# Patient Record
Sex: FEMALE | Race: OTHER | Employment: UNEMPLOYED | URBAN - METROPOLITAN AREA
[De-identification: names, ages, dates, MRNs, and addresses within clinical notes are randomized per-mention and may not be internally consistent; named-entity substitution may affect disease eponyms.]

---

## 2017-03-16 ENCOUNTER — OFFICE VISIT (OUTPATIENT)
Dept: FAMILY MEDICINE CLINIC | Age: 42
End: 2017-03-16

## 2017-03-16 VITALS
SYSTOLIC BLOOD PRESSURE: 135 MMHG | BODY MASS INDEX: 23.09 KG/M2 | WEIGHT: 110 LBS | TEMPERATURE: 98.1 F | DIASTOLIC BLOOD PRESSURE: 89 MMHG | HEART RATE: 78 BPM | HEIGHT: 58 IN

## 2017-03-16 DIAGNOSIS — J40 BRONCHITIS: Primary | ICD-10-CM

## 2017-03-16 RX ORDER — LORATADINE 10 MG/1
10 TABLET ORAL DAILY
Qty: 30 TAB | Refills: 1 | Status: SHIPPED | OUTPATIENT
Start: 2017-03-16 | End: 2018-01-11 | Stop reason: ALTCHOICE

## 2017-03-16 RX ORDER — IBUPROFEN 600 MG/1
600 TABLET ORAL
Qty: 60 TAB | Refills: 1 | Status: SHIPPED | OUTPATIENT
Start: 2017-03-16 | End: 2018-01-11 | Stop reason: SDUPTHER

## 2017-03-16 RX ORDER — PSEUDOEPHEDRINE HCL 30 MG
30 TABLET ORAL
Qty: 20 TAB | Refills: 1 | Status: SHIPPED | OUTPATIENT
Start: 2017-03-16 | End: 2018-01-11 | Stop reason: ALTCHOICE

## 2017-03-16 NOTE — PROGRESS NOTES
Coordination of Care  1. Have you been to the ER, urgent care clinic since your last visit? Hospitalized since your last visit? No    2. Have you seen or consulted any other health care providers outside of the [address] [place] [address] since your last visit? Include any pap smears or colon screening. No    Medications  Medication Reconciliation Performed: no  Patient does not need refills     Learning Assessment Complete?  yes

## 2017-03-16 NOTE — PATIENT INSTRUCTIONS
Bronquitis: Instrucciones de cuidado - [ Bronchitis: Care Instructions ]  Instrucciones de cuidado    La bronquitis es julieta inflamación de los bronquios (conductos bronquiales), que llevan aire a los pulmones. Los tubos se hinchan y producen mucosidad, o flema. La mucosidad y los bronquios inflamados hacen que tosa. Es posible que tenga problemas para respirar. [place] de los casos de bronquitis son causados por virus fady los que causan los resfriados. Los antibióticos generalmente no ayudan y pueden ser dañinos. La bronquitis suele aparecer con Ulysess Fling y dura alrededor de 2 a 3 semanas en personas que por lo demás son saludables. La atención de seguimiento es julieta parte clave de ayala tratamiento y seguridad. Asegúrese de hacer y acudir a todas las citas, y llame a ayala médico si está teniendo problemas. También es julieta buena idea saber los resultados de los exámenes y mantener julieta lista de los medicamentos que lilly. ¿Cómo puede cuidarse en el hogar? · [employer] medicamentos exactamente fady le fueron recetados. Llame a ayala médico si radha que está teniendo un problema con joseph medicamentos. · Descanse un poco más. · [place] un analgésico (medicamento para el dolor) de venta stacia, fady acetaminofén (Tylenol), ibuprofeno (Advil, Motrin) o naproxeno (Aleve), para reducir la fiebre y [employer] myriam en el cuerpo. Silvana y siga todas las instrucciones de la [place]. · No tome dos o más analgésicos al mismo tiempo a menos que el médico se lo haya indicado. Muchos analgésicos contienen acetaminofén, es decir, Tylenol. El exceso de acetaminofén (Tylenol) puede ser dañino. · [place] un medicamento para la tos de venta stacia que contenga dextrometorfano para ayudar a aliviar la tos seca y persistente y así poder dormir. Evite los medicamentos para la tos que tengan más de un ingrediente [place]. Silvana y siga todas las instrucciones de la [place].   · Respire aire húmedo de un humidificador, ducha caliente o lavabo lleno de [place]. El calor y la humedad adelgazarán la mucosidad para que pueda expulsarla. · No fume. Fumar puede empeorar la bronquitis. Si necesita ayuda para dejar de fumar, hable con ayala médico sobre programas y medicamentos para dejar de fumar. Estos pueden aumentar joseph probabilidades de dejar el hábito para siempre. ¿Cuándo debe pedir ayuda? Llame al 911 en cualquier momento que considere que necesita atención de emergencia. Por ejemplo, llame si:  · [address] dificultades para respirar. Llame a ayala médico ahora mismo o busque atención médica inmediata si:  · Tiene nueva o peor dificultad para respirar. · Tose mucosidad (esputo) de color café oscuro o con richard. · Tiene julieta fiebre nueva o más juliocesar. · Tiene un salpullido nuevo. Preste especial atención a los cambios en ayala kyung y asegúrese de comunicarse con ayala médico si:  · Ayala tos es más profunda o más frecuente que antes, especialmente si nota más mucosidad o un cambio en el color de la mucosidad. · No mejora fady se esperaba. ¿Dónde puede encontrar más información en inglés? [place] a [email or web address] Escriba H333 en la búsqueda para aprender más acerca de \"Bronquitis: Instrucciones de cuidado - [ Bronchitis: Care Instructions ]. \"  Revisado: 23 olson, 2016  Versión del contenido: 11.1  © 0106-4739 [employer], Incorporated. Las instrucciones de cuidado fueron adaptadas bajo licencia por [employer] (which disclaims liability or warranty for this information). Si usted tiene [place] afección médica o sobre estas instrucciones, siempre pregunte a ayala profesional de kyung. [facility], Incorporated niega toda garantía o responsabilidad por ayala uso de esta información.

## 2017-03-16 NOTE — PROGRESS NOTES
Assessment/Plan:    Katie Richardson was seen today for generalized body aches, fever and cough. Diagnoses and all orders for this visit:    Bronchitis  -     pseudoephedrine (SUDAFED) 30 mg tablet; Take 1 Tab by mouth every six (6) hours as needed for Congestion. [place] 1 pastilla cada 6 horas  -     ibuprofen (MOTRIN) 600 mg tablet; Take 1 Tab by mouth every six (6) hours as needed for Pain. [place] 1 pastilla cada 6 horas  -     loratadine (CLARITIN) 10 mg tablet; Take 1 Tab by mouth daily. [place] 1 pastilla cada abiola        Follow-up Disposition:  Return if symptoms worsen or fail to improve. BELEN De Jesus Sa expressed understanding of this plan. An AVS was printed and given to the patient.      ----------------------------------------------------------------------    Chief Complaint   Patient presents with    Generalized Body Aches     since saturday    Fever    Cough       History of Present Illness:  Cough, sore throat, fever for 5 days. Body aches and fever ended 2 days ago. She is not taking any meds for the sxs. Cough is non productive, dry. Daughter has similar sxs but not as severe. She has not missed any work for the sxs until today. She asks for the next few days off, which is reasonable. No n/v/d. Appetite is okay but it hurts to swallow bc of her sore throat. Past Medical History:   Diagnosis Date    H/O seasonal allergies        Current Outpatient Prescriptions   Medication Sig Dispense Refill    pseudoephedrine (SUDAFED) 30 mg tablet Take 1 Tab by mouth every six (6) hours as needed for Congestion. [place] 1 pastilla cada 6 horas 20 Tab 1    ibuprofen (MOTRIN) 600 mg tablet Take 1 Tab by mouth every six (6) hours as needed for Pain. [place] 1 pastilla cada 6 horas 60 Tab 1    loratadine (CLARITIN) 10 mg tablet Take 1 Tab by mouth daily.  [place] 1 pastilla cada abiola 30 Tab 1       No Known Allergies    Social History   Substance Use Topics    Smoking status: Never Smoker    Smokeless tobacco: Not on file    Alcohol use No       No family history on file. Physical Exam:     Visit Vitals    /89 (BP 1 Location: Right arm, BP Patient Position: Sitting)    Pulse 78    Temp 98.1 °F (36.7 °C) (Oral)    Ht 4' 9.6\" (1.463 m)    Wt 110 lb (49.9 kg)    LMP 02/24/2017    BMI 23.31 kg/m2     gen all bundled up with hat and coat on in the clinic, afebrile.  Wearing a mask, dry cough often during the exam  A&Ox3  WDWN NAD  Respirations normal and non labored  HEENT- TM's bulging clear fluid  Nares patent swollen turbinates with clear mucoid discharge present  OP red no tonsil exudate present  Lungs are CTA donte   Cor RRR s1s2

## 2017-03-16 NOTE — MR AVS SNAPSHOT
Visit Information Sky Osman Personal Médico Departamento Tel[facility]fono del [facility]. Número de visita 3/16/2017 10:45 AM Clearance Jeri Esperanzasouleymaneai SarmientoKISHA [phone] Follow-up Instructions Return if symptoms worsen or fail to improve. Upcoming Health Maintenance Date Due DTaP/Tdap/Td series (1 - Tdap) 11/10/1994 PAP AKA CERVICAL CYTOLOGY 11/10/1994 INFLUENZA AGE 9 TO ADULT 8/1/2016 Alergias  Review Complete El: 11/3/2016 Por: Raza Guzman A partir del:  3/16/2017 No Known Allergies Vacunas actuales Sherren Ala No hay ninguna vacuna archivada. No revisadas esta visita You Were Diagnosed With   
  
 Fadumo Stammer Bronchitis    -  Primary ICD-10-CM: H05 ICD-9-CM: 050 Partes vitales PS Pulso Temperatura [place] ( percentil de crecimiento) Peso (percentil de crecimiento) LMP (última dasia) 135/89 (BP 1 Location: Right arm, BP Patient Position: Sitting) 78 98.1 °F (36.7 °C) (Oral) 4' 9.6\" (1.463 m) 110 lb (49.9 kg) 02/24/2017 BMI [facility]) Estado obstétrico Estatus de tabaquísmo 23.31 kg/m2 Having regular periods Never Smoker Historial de signos vitales BMI and BSA Data Body Mass Index Body Surface Area  
 23.31 kg/m 2 1.42 m 2 [facility] Name Phone [facility] [address] [facility] [phone] Hui lista de medicamentos actualizada Lista actualizada el: 3/16/17 11:49 AM.  Ganesh Warren use hui lista de medicamentos más reciente. ibuprofen 600 mg tablet También conocido fady:  MOTRIN Take 1 Tab by mouth every six (6) hours as needed for Pain. [place] 1 pastilla cada 6 horas  
  
 loratadine 10 mg tablet También conocido fady:  Carlos Enrique Shayla Take 1 Tab by mouth daily. [place] 1 pastilla cada abiola  
  
 pseudoephedrine 30 mg tablet También conocido fady:  SUDAFED  
 Take 1 Tab by mouth every six (6) hours as needed for Congestion. [place] 1 pastilla cada 6 horas Recetas Enviado a la [place] Refills  
 pseudoephedrine (SUDAFED) 30 mg tablet 1 Sig: Take 1 Tab by mouth every six (6) hours as needed for Congestion. [place] 1 pastilla cada 6 horas Class: Normal  
 Pharmacy: [id number] [facility] [address] [facility] [address], [address] meinKauf Ph #: [phone] Route: Oral  
 ibuprofen (MOTRIN) 600 mg tablet 1 Sig: Take 1 Tab by mouth every six (6) hours as needed for Pain. [place] 1 pastilla cada 6 horas Class: Normal  
 Pharmacy: [id number] [facility] [address] [facility], [address] Gisselle Kurt Ph #: [phone] Route: Oral  
 loratadine (CLARITIN) 10 mg tablet 1 Sig: Take 1 Tab by mouth daily. [place] 1 pastilla cada abiola Class: Normal  
 Pharmacy: [id number] [facility] [address] [facility], [address] [employer] Ph #: [phone] Route: Oral  
  
Instrucciones de seguimiento Return if symptoms worsen or fail to improve. Instrucciones para el Paciente Bronquitis: Instrucciones de cuidado - [ Bronchitis: Care Instructions ] Instrucciones de cuidado La bronquitis es julieta inflamación de los bronquios (conductos bronquiales), que llevan aire a los pulmones. Los tubos se hinchan y producen mucosidad, o flema. La mucosidad y los bronquios inflamados hacen que tosa. Es posible que tenga problemas para respirar. [place] de los casos de bronquitis son causados por virus fady los que causan los resfriados. Los antibióticos generalmente no ayudan y pueden ser dañinos. La bronquitis suele aparecer con Darinelo Hadley y dura alrededor de 2 a 3 semanas en personas que por lo demás son saludables. La atención de seguimiento es julieta parte clave de ayala tratamiento y seguridad. Asegúrese de hacer y acudir a todas las citas, y llame a ayala médico si está teniendo problemas.  También es julieta buena idea [employer] resultados de los exámenes y mantener julieta lista de los medicamentos que lilly. Cómo puede cuidarse en el hogar? · [employer] medicamentos exactamente fady le fueron recetados. Llame a ayala médico si radha que está teniendo un problema con joseph medicamentos. · Descanse un poco más. · [place] un analgésico (medicamento para el dolor) de venta stacia, fady acetaminofén (Tylenol), ibuprofeno (Advil, Motrin) o naproxeno (Aleve), para reducir la fiebre y [employer] myriam en el cuerpo. [place] y siga todas las instrucciones de la [place]. · No tome dos o más analgésicos al mismo tiempo a menos que el médico se lo haya indicado. Muchos analgésicos contienen acetaminofén, es decir, Tylenol. El exceso de acetaminofén (Tylenol) puede ser dañino. · [place] un medicamento para la tos de venta stacia que contenga dextrometorfano para ayudar a aliviar la tos seca y persistente y así poder dormir. Evite los medicamentos para la tos que tengan más de un ingrediente [place]. Silvana y siga todas las instrucciones de la [place]. · Respire aire húmedo de un humidificador, ducha caliente o lavabo lleno de [place]. El calor y la humedad adelgazarán la mucosidad para que pueda expulsarla. · No fume. Fumar puede empeorar la bronquitis. Si necesita ayuda para dejar de fumar, hable con ayala médico sobre programas y medicamentos para dejar de fumar. Estos pueden aumentar joseph probabilidades de dejar el hábito para siempre. Cuándo debe pedir ayuda? Llame al 911 en cualquier momento que considere que necesita atención de emergencia. Por ejemplo, llame si: · [address] dificultades para respirar. Llame a ayala médico ahora mismo o busque atención médica inmediata si: · Tiene nueva o peor dificultad para respirar. · Tose mucosidad (esputo) de color café oscuro o con richard. · Tiene julieta fiebre nueva o más juliocesar. · Tiene un salpullido nuevo.  
Preste especial atención a los cambios en ayala kyung y asegúrese de comunicarse con ayala médico si: 
 · Hui tos es más profunda o más frecuente que antes, especialmente si nota más mucosidad o un cambio en el color de la mucosidad. · No mejora fady se esperaba. Dónde puede encontrar más información en inglés? Violeta Cannon a [email or web address] Escriba H333 en la búsqueda para aprender más acerca de \"Bronquitis: Instrucciones de cuidado - [ Bronchitis: Care Instructions ]. \" 
Revisado: 23 [place], 2016 Versión del contenido: 11.1 © 4209-1177 Healthwise, Incorporated. Las instrucciones de cuidado fueron adaptadas bajo licencia por Good Help Connections (which disclaims liability or warranty for this information). Si usted tiene [place] afección médica o sobre estas instrucciones, siempre pregunte a hui profesional de kyung. Healthwise, Incorporated niega toda garantía o responsabilidad por hui uso de esta información. Introducing [facility]! [facility] introduce portal paciente MyChart . Ahora se puede acceder a partes de hui expediente médico, enviar por correo electrónico la oficina de hui médico y solicitar renovaciones de medicamentos en línea. En hui navegador de Internet , [place] a [email or web address] don en el usuario por Kay Hernández? Traci ochoa aquí en la sesión Mana Funk. Verá la página de registro [place]. Ingrese hui código de [facility] kathy y fady aparece a continuación. Usted no tendrá que [employer] código después de zeferino completado el proceso de registro . Si usted no se inscribe antes de la fecha de caducidad , debe solicitar un nuevo código. · MyChart Código de acceso : [id number] Expires: 6/14/2017 11:05 AM 
 
Ingresa los últimos cuatro dígitos de hui Número de Seguro Social ( xxxx ) y fecha de nacimiento ( dd / mm / aaaa ) fady se indica y urvashi clic en Enviar. Usted será llevado a la siguiente página de registro . Crear un ID MyChart .  Esta será hui ID de inicio de sesión de MyChart y no puede ser [place] , por lo que pensar en julieta que es guevara y fácil de recordar . Crear julieta contraseña MyChart . Usted puede cambiar ayala contraseña en cualquier momento . Ingrese ayala Password Reset de preguntas y [place] . [place] se puede utilizar en un momento posterior si usted olvida ayala contraseña. Introduzca ayala dirección de correo electrónico . Brittanylivier Heaton recibirá julieta notificación por correo electrónico cuando la nueva información está disponible en MyChart . Shoaib Pillar clic en Registrarse. Di Mings unruly y descargar porciones de ayala expediente médico. 
Suzanna clic en el enlace de descarga del menú Resumen para descargar julieta copia portátil de ayala información médica . Si tiene [employer] , por favor visite la sección de preguntas frecuentes del sitio web MyChart . Recuerde, MyChart NO es que se utilizará para las necesidades urgentes. Para emergencias médicas , llame al 911 . Ahora disponible en ayala iPhone y Android ! Por favor proporcione ale resumen de la documentación de cuidado a ayala próximo proveedor. Your primary care clinician is listed as NONE. If you have any questions after today's visit, please call [phone].

## 2018-01-11 ENCOUNTER — OFFICE VISIT (OUTPATIENT)
Dept: FAMILY MEDICINE CLINIC | Age: 43
End: 2018-01-11

## 2018-01-11 VITALS
BODY MASS INDEX: 22.46 KG/M2 | SYSTOLIC BLOOD PRESSURE: 137 MMHG | HEIGHT: 58 IN | TEMPERATURE: 98 F | WEIGHT: 107 LBS | DIASTOLIC BLOOD PRESSURE: 83 MMHG | OXYGEN SATURATION: 98 % | HEART RATE: 65 BPM

## 2018-01-11 DIAGNOSIS — G89.29 CHRONIC RIGHT-SIDED BACK PAIN, UNSPECIFIED BACK LOCATION: Primary | ICD-10-CM

## 2018-01-11 DIAGNOSIS — M54.9 CHRONIC RIGHT-SIDED BACK PAIN, UNSPECIFIED BACK LOCATION: Primary | ICD-10-CM

## 2018-01-11 DIAGNOSIS — M54.13 RADICULOPATHY OF CERVICOTHORACIC REGION: ICD-10-CM

## 2018-01-11 RX ORDER — IBUPROFEN 600 MG/1
600 TABLET ORAL
Qty: 40 TAB | Refills: 1 | Status: SHIPPED | OUTPATIENT
Start: 2018-01-11

## 2018-01-11 RX ORDER — AMITRIPTYLINE HYDROCHLORIDE 25 MG/1
25 TABLET, FILM COATED ORAL
Qty: 30 TAB | Refills: 1 | Status: SHIPPED | OUTPATIENT
Start: 2018-01-11

## 2018-01-11 NOTE — PROGRESS NOTES
Subjective:     Chief Complaint   Patient presents with    Neck Pain     denies injury, 8/10 pain since a month ago, radiates down to the right arm, denies n/v, or sob    Dental Injury        She  is a 38 y.o. female who presents for evaluation of neck pain. Onset 1 month ago w/o accident/trauma. Pt recalls she woke up w/ the pain. 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 Pain is worse w/ activity. Pain is minimal when she first wakes up, but worsens. Notes a pulsing pain. Sometimes radiates into her R arm. Frequently has pain prior to falling asleep r/t pain, which is more like \"asleep sensation:\"    Generalized in hand and all fingers. Rx motrin has helped w/ discomfort. ROS  Gen - no fever/chills  Resp - no dyspnea or cough  CV - no chest pain or TA  Rest per HPI    Past Medical History:   Diagnosis Date    H/O seasonal allergies      No past surgical history on file. Current Outpatient Prescriptions on File Prior to Visit   Medication Sig Dispense Refill    pseudoephedrine (SUDAFED) 30 mg tablet Take 1 Tab by mouth every six (6) hours as needed for Congestion. [place] 1 pastilla cada 6 horas 20 Tab 1    ibuprofen (MOTRIN) 600 mg tablet Take 1 Tab by mouth every six (6) hours as needed for Pain. [place] 1 pastilla cada 6 horas 60 Tab 1    loratadine (CLARITIN) 10 mg tablet Take 1 Tab by mouth daily. [place] 1 pastilla cada abiola 30 Tab 1     No current facility-administered medications on file prior to visit.          Objective:     Vitals:    01/11/18 1436   BP: 137/83   Pulse: 65   Temp: 98 °F (36.7 °C)   TempSrc: Oral   SpO2: 98%   Weight: 107 lb (48.5 kg)   Height: 4' 9.68\" (1.465 m)       Physical Examination:  General appearance - alert, well appearing, and in no distress  Eyes -sclera anicteric  Neck - supple, no significant adenopathy, no thyromegaly  Chest - clear to auscultation, no wheezes, rales or rhonchi, symmetric air entry  Heart - normal rate, regular rhythm, normal S1, S2, no murmurs, rubs, clicks or gallops  Neurological - alert, oriented, no focal findings or movement disorder noted  Abdomen-BS present/WNL x 4 quads, non-tender/distended, soft,no organomegaly    Assessment/ Plan:   Diagnoses and all orders for this visit:    1. Chronic right-sided back pain, unspecified back location  -     ibuprofen (MOTRIN) 600 mg tablet; Take 1 Tab by mouth every six (6) hours as needed for Pain. [place] 1 pastilla cada 6 horas  -     XR SPINE THORAC 3 V; Future  -     XR SPINE CERV 4 OR 5 V; Future  -     CBC W/O DIFF; Future  -     METABOLIC PANEL, COMPREHENSIVE; Future  -     HEMOGLOBIN A1C WITH EAG; Future  -     TSH 3RD GENERATION; Future  -     LIPID PANEL; Future    2. Radiculopathy of cervicothoracic region  -     XR SPINE THORAC 3 V; Future  -     XR SPINE CERV 4 OR 5 V; Future  -     amitriptyline (ELAVIL) 25 mg tablet; Take 1 Tab by mouth nightly. [place] 1 tab por la noche. Suspect radiculopathy given Hx/onset. Refer for plain films of T and C spine and to [facility] for Dr Efren Preston eval/chiropractor. Start motrin 600mg TID x 5 days then PRN. Elavil QHS to help w/  Sleep and nerve pain/discomfort. Counseled on side effect and starting 1/2 tab x 3-4 nights then full one as tolerated. Entered fasting labs Pt can have done at [facility] prior to [facility]. F/u with PCP after initial eval w/ Dr Efren Preston. Change POC PRN if labs or xrays abnormal.       I have discussed the diagnosis with the patient and the intended plan as seen in the above orders. The patient has received an after-visit summary and questions were answered concerning future plans. I have discussed medication side effects and warnings with the patient as well. The patient verbalizes understanding and agreement with the plan.     Follow-up Disposition: Not on File

## 2018-01-11 NOTE — PROGRESS NOTES
Coordination of Care  1. Have you been to the ER, urgent care clinic since your last visit? Hospitalized since your last visit? No    2. Have you seen or consulted any other health care providers outside of the [address] [place] [address] since your last visit? Include any pap smears or colon screening. No    Medications  Does the patient need refills? N/A    Learning Assessment Complete?  yes

## 2018-01-11 NOTE — PROGRESS NOTES
Statements below were documented by Manisha Jacob, [facility]  for this patient visit was Sumit Gonzales discharged home with AVS and Medication teaching . No further questions. Reviewed patient's medications, how to take, where to pickup and if any refills, patient verbalized understanding and has no questions. Copies of orders for x-rays of spine given and address to [facility] given with walk in hours for outpatient test. Patient given information about care card because the test ordered will be billed to patient . Patient explained to answer phone because a call would be coming in from [facility] to start the financial screening process fro Care Card. Patient verbalized understanding and has no questions . Patient explained that when bill is received to please call the number on the bill and explain that they are working on getting the care card. Patient acknowleged and understands the process without aany questions . Patient informed that if [facility] does not call to contact them in 3 weeks  to please give them a call .  Manisha Jacob RN

## 2018-01-11 NOTE — PATIENT INSTRUCTIONS
Dolor de eunice: Instrucciones de cuidado - [ Neck Pain: Care Instructions ]  Instrucciones de cuidado    Usted puede tener dolor de eunice en cualquier lugar desde la parte inferior de la william [employer] parte superior de los hombros. Puede extenderse [employer] parte superior de la espalda o los brazos. Lesionarse, pintar un techo, dormir con el eunice torcido, permanecer en la misma posición demasiado tiempo y [place] otras actividades pueden causar dolor de eunice. La mayoría de los myriam de eunice mejoran con cuidados en el hogar. Ayala médico podría recomendarle un medicamento para aliviar el dolor o relajar los músculos. Podría sugerir ejercicio y fisioterapia para aumentar la flexibilidad y aliviar el estrés. Maxx vez tenga que usar un collarín (cervical) especial para sostener el eunice por un [address] La atención de seguimiento es julieta parte clave de ayala tratamiento y seguridad. Asegúrese de hacer y acudir a todas las citas, y llame a ayala médico si está teniendo problemas. También es julieta buena idea saber los resultados de los exámenes y mantener julieta lista de los medicamentos que lilly. ¿Cómo puede cuidarse en el [facility]? · Pruebe a usar julieta almohadilla térmica a temperatura baja o mediana harpreet 15 a 20 minutos cada 2 o 3 horas. Pruebe julieta ducha tibia en vez de julieta sesión con la almohadilla térmica. · También puede probar julieta compresa de hielo harpreet 10 a 15 minutos cada 2 o 3 horas. Póngase un paño almaguer entre la compresa de hielo y la piel. · [employer] analgésicos (medicamentos para el dolor) exactamente fady le fueron indicados. ¨ Si el médico le recetó un analgésico, tómelo según las indicaciones. ¨ Si no está tomando un analgésico recetado, pregúntele a ayala médico si puede pattie jesus de [employer]. · Si ayala médico le recomienda un collarín cervical, úselo exactamente fady le fue indicado. ¿Cuándo debe pedir ayuda? Llame a ayala médico ahora mismo o busque atención médica inmediata si:  ?  · Tiene entumecimiento nuevo o que TERE Meng, las nalgas o las piernas. ? · Tiene debilidad nueva o que empeora en los brazos o las piernas. ([place] puede hacer que sea difícil ponerse de pie). ? · Pierde el control de la vejiga o los intestinos. ?Preste especial atención a los cambios en ayala kyung y asegúrese de comunicarse con ayala médico si:  ? · Ayala dolor de eunice empeora. ? · No está mejorando después de 1 semana. ? · No mejora fady se esperaba. ¿Dónde puede encontrar más información en inglés? Parvez Hager a http://[email or web address].info/. Escriba J483 en la búsqueda para aprender más acerca de \"Dolor de eunice: Instrucciones de cuidado - [ Neck Pain: Care Instructions ]. \"  Revisado: 21 marzo, 2017  Versión del contenido: 11.4  © 4934-5190 Healthwise, Incorporated. Las instrucciones de cuidado fueron adaptadas bajo licencia por [employer] (which disclaims liability or warranty for this information). Si usted tiene [place] afección médica o sobre estas instrucciones, siempre pregunte a ayala profesional de kyung. [facility], [employer] niega toda garantía o responsabilidad por ayala uso de esta información.

## 2018-01-11 NOTE — MR AVS SNAPSHOT
Visit Information Jhonny So Personal Médico Departamento Teléfono del Dep. Número de visita 1/11/2018 12:45 PM Imani Rincon NP [facility] [phone] Upcoming Health Maintenance Date Due DTaP/Tdap/Td series (1 - Tdap) 11/10/1994 PAP AKA CERVICAL CYTOLOGY 11/10/1994 Influenza Age 5 to Adult 8/1/2017 [place]  Review Complete El: 1/11/2018 Por: Imani Rincon NP  
 Denis Embs del:  1/11/2018 No Known Allergies Vacunas actuales Lanette Lewis No hay ninguna vacuna archivada. No revisadas esta visita You Were Diagnosed With   
  
 Jolly Ao Chronic right-sided back pain, unspecified back location    -  Primary ICD-10-CM: M54.9, G89.29 ICD-9-CM: 724.5, 338.29 Bronchitis     ICD-10-CM: J40 ICD-9-CM: 942 Radiculopathy of cervicothoracic region     ICD-10-CM: M54.13 ICD-9-CM: 723.4 Partes vitales PS Pulso Temperatura Melvin ( percentil de crecimiento) Peso (percentil de crecimiento) LMP (última dasia)  
 137/83 (BP 1 Location: Right arm, BP Patient Position: Sitting) 65 98 °F (36.7 °C) (Oral) 4' 9.68\" (1.465 m) 107 lb (48.5 kg) 12/27/2017 SpO2 BMI ([facility]) Estado obstétrico Estatus de tabaquísmo 98% 22.61 kg/m2 Having regular periods Never Smoker Historial de signos vitales BMI and BSA Data Body Mass Index Body Surface Area  
 22.61 kg/m 2 1.4 m 2 [facility] Name Phone [address] [employer] [facility] [address], [address] [place] [phone] Hui lista de medicamentos actualizada Lista actualizada el: 1/11/18  3:33 PM.  Josué Curry use hui lista de medicamentos más reciente. amitriptyline 25 mg tablet También conocido fady:  ELAVIL Take 1 Tab by mouth nightly. [place] 1 tab por la noche. ibuprofen 600 mg tablet También conocido fady:  MOTRIN  
 Take 1 Tab by mouth every six (6) hours as needed for Pain. [place] 1 pastilla cada 6 horas Recetas Enviado a la Taney Refills  
 ibuprofen (MOTRIN) 600 mg tablet 1 Sig: Take 1 Tab by mouth every six (6) hours as needed for Pain. [place] 1 pastilla cada 6 horas Class: Normal  
 [facility] [address] [facility] [address] [facility] Ph #: [phone] Route: Oral  
 amitriptyline (ELAVIL) 25 mg tablet 1 Sig: Take 1 Tab by mouth nightly. [place] 1 tab por la noche. Class: Normal  
 Pharmacy: [address], [address] Ph #: [phone] Route: Oral  
  
Por hacer 01/19/2018 Lab:  CBC W/O DIFF   
  
 01/19/2018 Lab:  HEMOGLOBIN A1C WITH EAG   
  
 01/19/2018 Lab:  LIPID PANEL   
  
 01/19/2018 Lab:  METABOLIC PANEL, COMPREHENSIVE   
  
 01/19/2018 Lab:  TSH 3RD GENERATION   
  
 01/19/2018 Imaging:  XR SPINE CERV 4 OR 5 V   
  
 01/19/2018 Imaging:  XR SPINE THORAC 3 V Instrucciones para el Paciente Dolor de eunice: Instrucciones de cuidado - [ Neck Pain: Care Instructions ] Instrucciones de cuidado Usted puede tener dolor de eunice en cualquier lugar desde la parte inferior de la william [employer] parte superior de los hombros. Puede extenderse [employer] parte superior de la espalda o los brazos. Lesionarse, pintar un techo, dormir con el eunice torcido, permanecer en la misma posición demasiado tiempo y [place] otras actividades pueden causar dolor de eunice. La mayoría de los myriam de eunice mejoran con cuidados en el hogar. Hui médico podría recomendarle un medicamento para aliviar el dolor o relajar los músculos. Podría sugerir ejercicio y fisioterapia para aumentar la flexibilidad y aliviar el estrés. Maxx vez tenga que usar un collarín (cervical) especial para sostener el eunice por un [address]  
Ortiz Crutch de seguimiento es julieta parte clave de hui tratamiento y seguridad. Asegúrese de hacer y acudir a todas las citas, y llame a hui médico si está teniendo problemas. También es julieta buena idea saber los resultados de los exámenes y mantener julieta lista de los medicamentos que lilly. Cómo puede cuidarse en el hogar? · Pruebe a usar julieta almohadilla térmica a temperatura baja o mediana harpreet 15 a 20 minutos cada 2 o 3 horas. Pruebe julieta ducha tibia en vez de julieta sesión con la almohadilla térmica. · También puede probar julieta compresa de hielo harpreet 10 a 15 minutos cada 2 o 3 horas. Póngase un paño almaguer entre la compresa de hielo y la piel. · [employer] analgésicos (medicamentos para el dolor) exactamente fady le fueron indicados. ¨ Si el médico le recetó un analgésico, tómelo según las indicaciones. ¨ Si no está tomando un analgésico recetado, pregúntele a hui médico si puede pattie jesus de [employer]. · Si hui médico le recomienda un collarín cervical, úselo exactamente fady le fue indicado. Cuándo debe pedir ayuda? Llame a hui médico ahora mismo o busque atención médica inmediata si: 
? · Tiene entumecimiento nuevo o que TERE Meng, las nalgas o las piernas. ? · Tiene debilidad nueva o que empeora en los brazos o las piernas. ([place] puede hacer que sea difícil ponerse de pie). ? · Pierde el control de la vejiga o los intestinos. ?Preste especial atención a los cambios en hui kyung y asegúrese de comunicarse con hui médico si: 
? · Hui dolor de eunice empeora. ? · No está mejorando después de 1 semana. ? · No mejora fady se esperaba. Dónde puede encontrar más información en inglés? Rajinder Begin a [email or web address]/. Escriba H862 en la búsqueda para aprender más acerca de \"Dolor de eunice: Instrucciones de cuidado - [ Neck Pain: Care Instructions ]. \" 
Revisado: 21 marzo, 2017 Versión del contenido: 11.4 © 7255-3851 Healthwise, Incorporated.  Las instrucciones de cuidado fueron adaptadas bajo licencia por [employer] (which disclaims liability or warranty for this information). Si usted tiene [place] afección médica o sobre estas instrucciones, siempre pregunte a ayala profesional de kyung. [facility], Incorporated niega toda garantía o responsabilidad por ayala uso de esta información. Introducing [facility] SERVICES! [facility] introduce portal paciente MyChart . Ahora se puede acceder a partes de ayala expediente médico, enviar por correo electrónico la oficina de ayala médico y solicitar renovaciones de medicamentos en línea. En ayala navegador de Internet , [employer] a [email or web address] clic en el usuario por Evi Husbands? Sarahy Dennis clic aquí en la sesión [facility]. Verá la página de registro [place]. Ingrese ayala código de [employer] kathy y fady aparece a continuación. Usted no tendrá que [employer] código después de zeferino completado el proceso de registro . Si usted no se inscribe antes de la fecha de caducidad , debe solicitar un nuevo código. · MyChart Código de acceso : [id number]- Expires: 4/11/2018  3:33 PM 
 
Ingresa los últimos cuatro dígitos de ayala Número de Seguro Social ( xxxx ) y fecha de nacimiento ( dd / mm / aaaa ) fady se indica y urvashi clic en Enviar. ted será llevado a la siguiente página de registro . Crear un ID MyChart . Esta será ayala ID de inicio de sesión de MyChart y no puede ser [place] , por lo que pensar en julieta que es Eliseo Moncho y fácil de recordar . Crear julieta contraseña MyChart . Usted puede cambiar ayala contraseña en cualquier momento . Ingrese ayala Password Reset de preguntas y [place] . [place] se puede utilizar en un momento posterior si usted olvida ayala contraseña. Introduzca ayala dirección de correo electrónico . Henrique Hancock recibirá julieta notificación por correo electrónico cuando la nueva información está disponible en MyChart . Rad Triplettw don en Registrarse.  Litpatricia Chi unruly y descargar porciones de ayala expediente Sal[place] Kaitlynn clic en el enlace de descarga del menú Resumen para descargar julieta copia portátil de ayala información médica . Si tiene [employer] , por favor visite la sección de preguntas frecuentes del sitio web MyChart . Recuerde, MyChart NO es que se utilizará para las necesidades urgentes. Para emergencias médicas , llame al 911 . Ahora disponible en ayala iPhone y Android ! Por favor proporcione ale resumen de la documentación de cuidado a ayala próximo proveedor. Your primary care clinician is listed as NONE. If you have any questions after today's visit, please call [phone].

## 2018-01-16 ENCOUNTER — HOSPITAL ENCOUNTER (OUTPATIENT)
Dept: GENERAL RADIOLOGY | Age: 43
Discharge: HOME | End: 2018-01-16
Payer: SELF-PAY

## 2018-01-16 DIAGNOSIS — M54.13 RADICULOPATHY OF CERVICOTHORACIC REGION: ICD-10-CM

## 2018-01-16 DIAGNOSIS — G89.29 CHRONIC RIGHT-SIDED BACK PAIN, UNSPECIFIED BACK LOCATION: ICD-10-CM

## 2018-01-16 DIAGNOSIS — M54.9 CHRONIC RIGHT-SIDED BACK PAIN, UNSPECIFIED BACK LOCATION: ICD-10-CM

## 2018-01-16 PROCEDURE — 72070 X-RAY EXAM THORAC SPINE 2VWS: CPT

## 2018-01-16 PROCEDURE — 72050 X-RAY EXAM NECK SPINE 4/5VWS: CPT

## 2018-01-18 ENCOUNTER — CLINICAL SUPPORT (OUTPATIENT)
Dept: FAMILY MEDICINE CLINIC | Age: 43
End: 2018-01-18

## 2018-01-18 ENCOUNTER — HOSPITAL ENCOUNTER (OUTPATIENT)
Dept: LAB | Age: 43
Discharge: HOME | End: 2018-01-18

## 2018-01-18 DIAGNOSIS — M54.9 CHRONIC RIGHT-SIDED BACK PAIN, UNSPECIFIED BACK LOCATION: ICD-10-CM

## 2018-01-18 DIAGNOSIS — Z23 ENCOUNTER FOR IMMUNIZATION: Primary | ICD-10-CM

## 2018-01-18 DIAGNOSIS — G89.29 CHRONIC RIGHT-SIDED BACK PAIN, UNSPECIFIED BACK LOCATION: ICD-10-CM

## 2018-01-18 LAB
BASOPHILS # BLD: 0 K/UL (ref 0–0.1)
BASOPHILS NFR BLD: 0 % (ref 0–1)
EOSINOPHIL # BLD: 0.2 K/UL (ref 0–0.4)
EOSINOPHIL NFR BLD: 3 % (ref 0–7)
ERYTHROCYTE [DISTWIDTH] IN BLOOD BY AUTOMATED COUNT: 13.3 % (ref 11.5–14.5)
HCT VFR BLD AUTO: 39.9 % (ref 35–47)
HGB BLD-MCNC: 12.9 G/DL (ref 11.5–16)
LYMPHOCYTES # BLD: 2.2 K/UL (ref 0.8–3.5)
LYMPHOCYTES NFR BLD: 31 % (ref 12–49)
MCH RBC QN AUTO: 29.5 PG (ref 26–34)
MCHC RBC AUTO-ENTMCNC: 32.3 G/DL (ref 30–36.5)
MCV RBC AUTO: 91.1 FL (ref 80–99)
MONOCYTES # BLD: 0.6 K/UL (ref 0–1)
MONOCYTES NFR BLD: 8 % (ref 5–13)
NEUTS SEG # BLD: 4.2 K/UL (ref 1.8–8)
NEUTS SEG NFR BLD: 58 % (ref 32–75)
PLATELET # BLD AUTO: 291 K/UL (ref 150–400)
RBC # BLD AUTO: 4.38 M/UL (ref 3.8–5.2)
WBC # BLD AUTO: 7.2 K/UL (ref 3.6–11)

## 2018-01-18 PROCEDURE — 80053 COMPREHEN METABOLIC PANEL: CPT | Performed by: NURSE PRACTITIONER

## 2018-01-18 PROCEDURE — 85025 COMPLETE CBC W/AUTO DIFF WBC: CPT | Performed by: NURSE PRACTITIONER

## 2018-01-18 PROCEDURE — 84443 ASSAY THYROID STIM HORMONE: CPT | Performed by: NURSE PRACTITIONER

## 2018-01-18 PROCEDURE — 80061 LIPID PANEL: CPT | Performed by: NURSE PRACTITIONER

## 2018-01-18 PROCEDURE — 83036 HEMOGLOBIN GLYCOSYLATED A1C: CPT | Performed by: NURSE PRACTITIONER

## 2018-01-18 NOTE — PROGRESS NOTES
Requests flu vaccine; denies fever, egg allergy. [facility] per protocol [facility] VIS information sheet given, explained possible S/E. Reviewed sx indicating need to be seen in ER. Pt had no adverse reaction at time of discharge.  Tabitha Crystal RN

## 2018-01-19 LAB
ALBUMIN SERPL-MCNC: 3.7 G/DL (ref 3.5–5)
ALBUMIN/GLOB SERPL: 1 {RATIO} (ref 1.1–2.2)
ALP SERPL-CCNC: 111 U/L (ref 45–117)
ALT SERPL-CCNC: 27 U/L (ref 12–78)
ANION GAP SERPL CALC-SCNC: 9 MMOL/L (ref 5–15)
AST SERPL-CCNC: 20 U/L (ref 15–37)
BILIRUB SERPL-MCNC: 0.3 MG/DL (ref 0.2–1)
BUN SERPL-MCNC: 17 MG/DL (ref 6–20)
BUN/CREAT SERPL: 30 (ref 12–20)
CALCIUM SERPL-MCNC: 8.8 MG/DL (ref 8.5–10.1)
CHLORIDE SERPL-SCNC: 106 MMOL/L (ref 97–108)
CHOLEST SERPL-MCNC: 213 MG/DL
CO2 SERPL-SCNC: 25 MMOL/L (ref 21–32)
CREAT SERPL-MCNC: 0.57 MG/DL (ref 0.55–1.02)
EST. AVERAGE GLUCOSE BLD GHB EST-MCNC: 123 MG/DL
GLOBULIN SER CALC-MCNC: 3.8 G/DL (ref 2–4)
GLUCOSE SERPL-MCNC: 98 MG/DL (ref 65–100)
HBA1C MFR BLD: 5.9 % (ref 4.2–6.3)
HDLC SERPL-MCNC: 49 MG/DL
HDLC SERPL: 4.3 {RATIO} (ref 0–5)
LDLC SERPL CALC-MCNC: 121.2 MG/DL (ref 0–100)
LIPID PROFILE,FLP: ABNORMAL
POTASSIUM SERPL-SCNC: 3.9 MMOL/L (ref 3.5–5.1)
PROT SERPL-MCNC: 7.5 G/DL (ref 6.4–8.2)
SODIUM SERPL-SCNC: 140 MMOL/L (ref 136–145)
TRIGL SERPL-MCNC: 214 MG/DL (ref ?–150)
TSH SERPL DL<=0.05 MIU/L-ACNC: 2.02 UIU/ML (ref 0.36–3.74)
VLDLC SERPL CALC-MCNC: 42.8 MG/DL

## 2018-01-29 ENCOUNTER — HOSPITAL ENCOUNTER (EMERGENCY)
Age: 43
Discharge: HOME | End: 2018-01-29
Attending: EMERGENCY MEDICINE
Payer: SELF-PAY

## 2018-01-29 VITALS
TEMPERATURE: 102 F | OXYGEN SATURATION: 98 % | DIASTOLIC BLOOD PRESSURE: 80 MMHG | HEIGHT: 58 IN | HEART RATE: 111 BPM | SYSTOLIC BLOOD PRESSURE: 145 MMHG | WEIGHT: 116.5 LBS | RESPIRATION RATE: 20 BRPM | BODY MASS INDEX: 24.45 KG/M2

## 2018-01-29 DIAGNOSIS — J11.1 INFLUENZA-LIKE SYNDROME: Primary | ICD-10-CM

## 2018-01-29 DIAGNOSIS — R50.9 ACUTE FEBRILE ILLNESS: ICD-10-CM

## 2018-01-29 LAB
FLUAV AG NPH QL IA: NEGATIVE
FLUBV AG NOSE QL IA: NEGATIVE
S PYO AG THROAT QL: NEGATIVE

## 2018-01-29 PROCEDURE — 74011250637 HC RX REV CODE- 250/637: Performed by: EMERGENCY MEDICINE

## 2018-01-29 PROCEDURE — 87804 INFLUENZA ASSAY W/OPTIC: CPT | Performed by: STUDENT IN AN ORGANIZED HEALTH CARE EDUCATION/TRAINING PROGRAM

## 2018-01-29 PROCEDURE — 87147 CULTURE TYPE IMMUNOLOGIC: CPT | Performed by: EMERGENCY MEDICINE

## 2018-01-29 PROCEDURE — 99283 EMERGENCY DEPT VISIT LOW MDM: CPT

## 2018-01-29 PROCEDURE — 87070 CULTURE OTHR SPECIMN AEROBIC: CPT | Performed by: EMERGENCY MEDICINE

## 2018-01-29 PROCEDURE — 74011250636 HC RX REV CODE- 250/636: Performed by: EMERGENCY MEDICINE

## 2018-01-29 PROCEDURE — 96372 THER/PROPH/DIAG INJ SC/IM: CPT

## 2018-01-29 PROCEDURE — 87880 STREP A ASSAY W/OPTIC: CPT

## 2018-01-29 RX ORDER — ACETAMINOPHEN 500 MG
1000 TABLET ORAL
Status: COMPLETED | OUTPATIENT
Start: 2018-01-29 | End: 2018-01-29

## 2018-01-29 RX ORDER — ACETAMINOPHEN 500 MG
1000 TABLET ORAL
Qty: 60 TAB | Refills: 0 | Status: SHIPPED | OUTPATIENT
Start: 2018-01-29

## 2018-01-29 RX ORDER — OSELTAMIVIR PHOSPHATE 75 MG/1
75 CAPSULE ORAL 2 TIMES DAILY
Qty: 10 CAP | Refills: 0 | Status: SHIPPED | OUTPATIENT
Start: 2018-01-29 | End: 2018-02-03

## 2018-01-29 RX ORDER — KETOROLAC TROMETHAMINE 30 MG/ML
30 INJECTION, SOLUTION INTRAMUSCULAR; INTRAVENOUS
Status: COMPLETED | OUTPATIENT
Start: 2018-01-29 | End: 2018-01-29

## 2018-01-29 RX ORDER — IBUPROFEN 800 MG/1
800 TABLET ORAL
Qty: 30 TAB | Refills: 0 | Status: SHIPPED | OUTPATIENT
Start: 2018-01-29

## 2018-01-29 RX ADMIN — KETOROLAC TROMETHAMINE 30 MG: 30 INJECTION, SOLUTION INTRAMUSCULAR at 22:34

## 2018-01-29 RX ADMIN — ACETAMINOPHEN 1000 MG: 500 TABLET, FILM COATED ORAL at 22:34

## 2018-01-29 NOTE — LETTER
[address] 
[address] [place], [place] [address] [address] 
[phone] Work/School Note Date: 1/29/2018 To Whom It May concern: 
 
Josue Joni was seen and treated today in the emergency room by the following provider(s): 
Attending Provider: Chaz Cehung MD.   
 
Josue Quinones {Return to school/sport/work:16289} Sincerely, 
 
 
 
 
Gayl Gilford

## 2018-01-29 NOTE — LETTER
[address] 
[address] [facility] [address] 
[phone] Work/School Note Date: 1/29/2018 To Whom It May concern: 
 
Johnny Matthew was seen and treated today in the emergency room by the following provider(s): 
Attending Provider: Ruben Carvajal MD.   
 
Johnny Matthew may return to work on 1/31/18. Sincerely, Clement Schroeder MD

## 2018-01-30 NOTE — ED PROVIDER NOTES
HPI Comments: The patient is a 78-year-old,  female, with limited English-speaking skills are presents to the ED with a complaint of body aches, sore throat, dry cough and congestion x2 days of headache, fever, and chills x2 days. She also admits to nausea and abdominal cramps. Denies any diarrhea, constipation, neck stiffness, back pain, chest pain, or shortness of breath with exertion, dysuria, hematuria, vaginal discharge or bleeding, dizziness, weakness, numbness, sick contact at home, skin rash, recent travel. Patient is a 38 y.o. female presenting with sore throat and general illness. Sore Throat      Generalized Body Aches          Past Medical History:   Diagnosis Date    H/O seasonal allergies        History reviewed. No pertinent surgical history. History reviewed. No pertinent family history. Social History     Social History    Marital status: SINGLE     Spouse name: N/A    Number of children: N/A    Years of education: N/A     Occupational History    Not on file. Social History Main Topics    Smoking status: Never Smoker    Smokeless tobacco: Never Used    Alcohol use No    Drug use: No    Sexual activity: Not on file     Other Topics Concern    Not on file     Social History Narrative         ALLERGIES: Review of patient's allergies indicates no known allergies. Review of Systems   HENT: Positive for sore throat. All other systems reviewed and are negative. Vitals:    01/29/18 2149   BP: 135/81   Pulse: (!) 105   Resp: 16   Temp: 100.4 °F (38 °C)   SpO2: 98%   Weight: 52.8 kg (116 lb 8 oz)   Height: 4' 9.5\" (1.461 m)            Physical Exam   Nursing note and vitals reviewed. CONSTITUTIONAL: Well-appearing; well-nourished; in mild distress  HEAD: Normocephalic; atraumatic  EYES: PERRL; EOM intact; conjunctiva and sclera are clear bilaterally.   ENT: No rhinorrhea; normal pharynx with no tonsillar hypertrophy; mucous membranes pink/moist, no erythema, no exudate. NECK: Supple; non-tender; no cervical lymphadenopathy  CARD: Normal S1, S2; no murmurs, rubs, or gallops. Regular rate and rhythm. RESP: Normal respiratory effort; breath sounds clear and equal bilaterally; no wheezes, rhonchi, or rales. ABD: Normal bowel sounds; non-distended; non-tender; no palpable organomegaly, no masses, no bruits. Back Exam: Normal inspection; no vertebral point tenderness, no CVA tenderness. Normal range of motion. EXT: Normal ROM in all four extremities; non-tender to palpation; no swelling or deformity; distal pulses are normal, no edema. SKIN: Warm; dry; no rash. NEURO:Alert and oriented x 3, coherent, WILVER-XII grossly intact, sensory and motor are non-focal.        MDM  Number of Diagnoses or Management Options  Diagnosis management comments: Assessment: 47-year-old female, who presents with  Influenza like syndrome-rule out strep pharyngitis. Plan: antipyretic/ rapid strep throat test with throat culture if negative/ education and reassurance/ Monitor and Reevaluate. Amount and/or Complexity of Data Reviewed  Clinical lab tests: ordered and reviewed  Tests in the radiology section of CPT®: ordered and reviewed  Tests in the medicine section of CPT®: reviewed and ordered  Discussion of test results with the performing providers: yes  Decide to obtain previous medical records or to obtain history from someone other than the patient: yes  Obtain history from someone other than the patient: yes  Review and summarize past medical records: yes  Discuss the patient with other providers: yes  Independent visualization of images, tracings, or specimens: yes    Risk of Complications, Morbidity, and/or Mortality  Presenting problems: moderate  Diagnostic procedures: moderate  Management options: moderate      ED Course       Procedures     Progress Note:   Pt has been reexamined by Burke Irvin MD. Pt is feeling much better. Symptoms have improved.  All available results have been reviewed with pt and any available family. Pt understands sx, dx, and tx in ED. Care plan has been outlined and questions have been answered. Pt is ready to go home. Will send home on Influenza/ acute febrile illness instruction. Prescription of Tylenol, ibuprofen, and Tamiflu. Outpatient referral with PCP as needed. Written by Kerri Phillips MD,10:59 PM    .   .

## 2018-01-30 NOTE — ED NOTES
MD reviewed discharge instructions with the patient. The patient verbalized understanding. Patient ambulated out of ED with . Work note given. HR and temp elevated. MD notified. No complaints or concerns noted.

## 2018-02-01 LAB
BACTERIA SPEC CULT: ABNORMAL
BACTERIA SPEC CULT: ABNORMAL
SERVICE CMNT-IMP: ABNORMAL

## 2018-02-05 ENCOUNTER — OFFICE VISIT (OUTPATIENT)
Dept: FAMILY MEDICINE CLINIC | Age: 43
End: 2018-02-05

## 2018-02-05 ENCOUNTER — HOSPITAL ENCOUNTER (OUTPATIENT)
Dept: LAB | Age: 43
Discharge: HOME | End: 2018-02-05

## 2018-02-05 VITALS
TEMPERATURE: 97.6 F | DIASTOLIC BLOOD PRESSURE: 79 MMHG | WEIGHT: 110 LBS | SYSTOLIC BLOOD PRESSURE: 141 MMHG | BODY MASS INDEX: 23.39 KG/M2 | HEART RATE: 68 BPM

## 2018-02-05 DIAGNOSIS — R30.0 DYSURIA: Primary | ICD-10-CM

## 2018-02-05 LAB
BILIRUB UR QL STRIP: NEGATIVE
GLUCOSE UR-MCNC: NEGATIVE MG/DL
KETONES P FAST UR STRIP-MCNC: NEGATIVE MG/DL
PH UR STRIP: 6 [PH] (ref 4.6–8)
PROT UR QL STRIP: NEGATIVE
SP GR UR STRIP: 1.02 (ref 1–1.03)
UA UROBILINOGEN AMB POC: NORMAL (ref 0.2–1)
URINALYSIS CLARITY POC: CLEAR
URINALYSIS COLOR POC: YELLOW
URINE BLOOD POC: NORMAL
URINE LEUKOCYTES POC: NEGATIVE
URINE NITRITES POC: NEGATIVE

## 2018-02-05 PROCEDURE — 87491 CHLMYD TRACH DNA AMP PROBE: CPT | Performed by: NURSE PRACTITIONER

## 2018-02-05 NOTE — PROGRESS NOTES
Assessment/Plan:       ICD-10-CM ICD-9-CM    1. Dysuria R30.0 788.1 AMB POC URINALYSIS DIP STICK MANUAL W/O MICRO      CHLAMYDIA/GC PCR   Urine for gonorrhea and chlamydia  Follow-up Disposition:  Return if symptoms worsen or fail to improve. [address], [address] [place]  [address] [facility] [address] [place] [address] [place] [address]  Phone: [phone] Fax: [phone]      [facility]  Subjective:   Burns with urination, back pain. L side.  #[id number]. Chief Complaint   Patient presents with    Back Pain     Back pain, Urinary burning X about 1 months    Swetha Freeman is a 38 y.o. PATIENT REFUSED female. HPI:   She  has a past medical history of H/O seasonal allergies. Review of Systems: Negative for: fever, chest pain, shortness of breath, leg swelling, exertional dyspnea, palpitations. Current Medications:   Current Outpatient Prescriptions on File Prior to Visit   Medication Sig    ibuprofen (MOTRIN) 800 mg tablet Take 1 Tab by mouth every six (6) hours as needed for Pain (Fever).  acetaminophen (TYLENOL EXTRA STRENGTH) 500 mg tablet Take 2 Tabs by mouth every six (6) hours as needed for Pain or Fever.  ibuprofen (MOTRIN) 600 mg tablet Take 1 Tab by mouth every six (6) hours as needed for Pain. [place] 1 pastilla cada 6 horas    amitriptyline (ELAVIL) 25 mg tablet Take 1 Tab by mouth nightly. [place] 1 tab por la noche. No current facility-administered medications on file prior to visit. Social History: She  reports that she has never smoked. She has never used smokeless tobacco. She reports that she does not drink alcohol or use illicit drugs. Objective:     Vitals:    02/05/18 1103   BP: 141/79   Pulse: 68   Temp: 97.6 °F (36.4 °C)   TempSrc: Oral   Weight: 110 lb (49.9 kg)    Patient's last menstrual period was 01/29/2018.    Wt Readings from Last 2 Encounters:   02/05/18 110 lb (49.9 kg)   01/29/18 116 lb 8 oz (52.8 kg)     Results for orders placed or performed in visit on 02/05/18   AMB POC URINALYSIS DIP STICK MANUAL W/O MICRO   Result Value Ref Range    Color (UA POC) Yellow     Clarity (UA POC) Clear     Glucose (UA POC) Negative Negative    Bilirubin (UA POC) Negative Negative    Ketones (UA POC) Negative Negative    Specific gravity (UA POC) 1.025 1.001 - 1.035    Blood (UA POC) Trace Negative    pH (UA POC) 6.0 4.6 - 8.0    Protein (UA POC) Negative Negative    Urobilinogen (UA POC) 0.2 mg/dL 0.2 - 1    Nitrites (UA POC) Negative Negative    Leukocyte esterase (UA POC) Negative Negative      Physical Examination:  General appearance - well developed, no acute distress. Chest - clear to auscultation. Heart - regular rate and rhythm without murmurs, rubs, or gallops. Abdomen - bowel sounds present x 4, NT, ND. Extremities - pulses intact. No peripheral edema. Musculoskeletal - no CVAT. Assessment/Plan:   Diagnoses and all orders for this visit:    1. Dysuria  -     AMB POC URINALYSIS DIP STICK MANUAL W/O MICRO  -     CHLAMYDIA/GC PCR; Future      Follow-up Disposition:  Return if symptoms worsen or fail to improve. Madison Lomeli, JAVIER, FNP-BC, BC-ADM  Prisca Leahy expressed understanding of this plan.

## 2018-02-05 NOTE — PROGRESS NOTES
Coordination of Care  1. Have you been to the ER, urgent care clinic since your last visit? Hospitalized since your last visit? Yes When: 1/29/18  [facility]    2. Have you seen or consulted any other health care providers outside of the [address] [place] [address] since your last visit? Include any pap smears or colon screening. No    Medications  Does the patient need refills?  YES    Learning Assessment Complete? yes  Results for orders placed or performed in visit on 02/05/18   AMB POC URINALYSIS DIP STICK MANUAL W/O MICRO   Result Value Ref Range    Color (UA POC) Yellow     Clarity (UA POC) Clear     Glucose (UA POC) Negative Negative    Bilirubin (UA POC) Negative Negative    Ketones (UA POC) Negative Negative    Specific gravity (UA POC) 1.025 1.001 - 1.035    Blood (UA POC) Trace Negative    pH (UA POC) 6.0 4.6 - 8.0    Protein (UA POC) Negative Negative    Urobilinogen (UA POC) 0.2 mg/dL 0.2 - 1    Nitrites (UA POC) Negative Negative    Leukocyte esterase (UA POC) Negative Negative

## 2018-02-07 LAB
C TRACH DNA SPEC QL NAA+PROBE: NEGATIVE
N GONORRHOEA DNA SPEC QL NAA+PROBE: NEGATIVE
SAMPLE TYPE: NORMAL
SERVICE CMNT-IMP: NORMAL
SPECIMEN SOURCE: NORMAL

## 2020-03-17 ENCOUNTER — HOSPITAL ENCOUNTER (EMERGENCY)
Age: 45
Discharge: HOME | End: 2020-03-17
Attending: EMERGENCY MEDICINE | Admitting: EMERGENCY MEDICINE
Payer: SELF-PAY

## 2020-03-17 VITALS
HEART RATE: 54 BPM | RESPIRATION RATE: 18 BRPM | DIASTOLIC BLOOD PRESSURE: 77 MMHG | OXYGEN SATURATION: 98 % | TEMPERATURE: 98.1 F | SYSTOLIC BLOOD PRESSURE: 120 MMHG

## 2020-03-17 DIAGNOSIS — J02.9 PHARYNGITIS, UNSPECIFIED ETIOLOGY: Primary | ICD-10-CM

## 2020-03-17 LAB — DEPRECATED S PYO AG THROAT QL EIA: NEGATIVE

## 2020-03-17 PROCEDURE — 87880 STREP A ASSAY W/OPTIC: CPT

## 2020-03-17 PROCEDURE — 99282 EMERGENCY DEPT VISIT SF MDM: CPT

## 2020-03-17 PROCEDURE — 87070 CULTURE OTHR SPECIMN AEROBIC: CPT

## 2020-03-17 RX ORDER — DICLOFENAC SODIUM 75 MG/1
75 TABLET, DELAYED RELEASE ORAL 2 TIMES DAILY
Qty: 30 TAB | Refills: 0 | Status: SHIPPED | OUTPATIENT
Start: 2020-03-17

## 2020-03-17 NOTE — ED PROVIDER NOTES
Viraj Segundo is a 53 y.o. female  who presents by private vehicle to ER with c/o Patient presents with:  Sore Throat  Patient presents with 1 week history of throat pain. Patient has taken ibuprofen with no relief. Patient denies fever or chills. Denies any other associated symptoms. Patient denies any recent travel or sick contacts. She specifically denies any fevers, chills, nausea, vomiting, chest pain, shortness of breath, headache, rash, diarrhea, abdominal pain, urinary/bowel changes, sweating or weight loss. PCP: Gwen North, NP   PMHx significant for: Past Medical History:  No date: H/O seasonal allergies   PSHx significant for: History reviewed. No pertinent surgical history. Social Hx: Tobacco use: Social History    Tobacco Use      Smoking status: Never Smoker      Smokeless tobacco: Never Used  ; EtOH use: The patient states she drinks 0 per week.; Illicit Drug use: Allergies:  No Known Allergies    There are no other complaints, changes or physical findings at this time. Past Medical History:   Diagnosis Date    H/O seasonal allergies        History reviewed. No pertinent surgical history. History reviewed. No pertinent family history.     Social History     Socioeconomic History    Marital status: SINGLE     Spouse name: Not on file    Number of children: Not on file    Years of education: Not on file    Highest education level: Not on file   Occupational History    Not on file   Social Needs    Financial resource strain: Not on file    Food insecurity     Worry: Not on file     Inability: Not on file    Transportation needs     Medical: Not on file     Non-medical: Not on file   Tobacco Use    Smoking status: Never Smoker    Smokeless tobacco: Never Used   Substance and Sexual Activity    Alcohol use: No    Drug use: No    Sexual activity: Not on file   Lifestyle    Physical activity     Days per week: Not on file     Minutes per session: Not on file    Stress: Not on file   Relationships    Social connections     Talks on phone: Not on file     Gets together: Not on file     Attends [employer] service: Not on file     Active member of club or organization: Not on file     Attends meetings of clubs or organizations: Not on file     Relationship status: Not on file    Intimate partner violence     Fear of current or ex partner: Not on file     Emotionally abused: Not on file     Physically abused: Not on file     Forced sexual activity: Not on file   Other Topics Concern    Not on file   Social History Narrative    Not on file         ALLERGIES: Patient has no known allergies. Review of Systems   Constitutional: Negative for activity change, chills and fever. HENT: Positive for sore throat. Negative for congestion and rhinorrhea. Respiratory: Negative for shortness of breath. Cardiovascular: Negative for chest pain and leg swelling. Gastrointestinal: Negative for abdominal pain, diarrhea, nausea and vomiting. Genitourinary: Negative for dysuria, vaginal bleeding and vaginal discharge. Musculoskeletal: Negative for arthralgias and myalgias. Neurological: Negative for dizziness. Psychiatric/Behavioral: Negative for confusion. All other systems reviewed and are negative. Vitals:    03/17/20 1940   BP: 120/77   Pulse: (!) 54   Resp: 18   Temp: 98.1 °F (36.7 °C)   SpO2: 98%            Physical Exam  Constitutional:       Appearance: Normal appearance. She is well-developed. She is not ill-appearing, toxic-appearing or diaphoretic. HENT:      Head: Normocephalic and atraumatic. Right Ear: External ear normal.      Left Ear: External ear normal.      Nose: Nose normal.      Mouth/Throat:      Pharynx: Uvula midline. Posterior oropharyngeal erythema present. No oropharyngeal exudate. Tonsils: No tonsillar exudate. Eyes:      General: Lids are normal.         Right eye: No discharge.          Left eye: No discharge. Conjunctiva/sclera: Conjunctivae normal.   Neck:      Musculoskeletal: Normal range of motion. Thyroid: No thyromegaly. Trachea: No tracheal deviation. Cardiovascular:      Rate and Rhythm: Normal rate and regular rhythm. Heart sounds: Normal heart sounds. Pulmonary:      Effort: Pulmonary effort is normal.      Breath sounds: Normal breath sounds. Abdominal:      General: Bowel sounds are normal.      Palpations: Abdomen is soft. Musculoskeletal: Normal range of motion. Skin:     General: Skin is warm and dry. Neurological:      Mental Status: She is alert and oriented to person, place, and time. Psychiatric:         Judgment: Judgment normal.          MDM  Number of Diagnoses or Management Options  Pharyngitis, unspecified etiology:   Diagnosis management comments: Assesment/Plan- 53 y.o. Patient presents with:  Sore Throat  differential includes: pharyngitis, strep. Labs and imaging reviewed with negative strep. Patient is well appearing, afebrile and tolerating PO . Recommend PCP follow up. Patient educated on reasons to return to the ED.            Procedures

## 2020-03-18 ENCOUNTER — PATIENT OUTREACH (OUTPATIENT)
Dept: FAMILY MEDICINE CLINIC | Age: 45
End: 2020-03-18

## 2020-03-18 NOTE — ED NOTES
Patient given discharge instructions. No questions or concerns at this time. Patients VSS and in no acute distress. Patient ambulatory out of tent at discharge.

## 2020-03-18 NOTE — DISCHARGE INSTRUCTIONS
Patient Education        Dolor de garganta: Instrucciones de cuidado  Sore Throat: Care Instructions  Instrucciones de cuidado    Julieta infección por un virus o julieta bacteria causa la mayoría de los myriam de garganta. El humo del cigarrillo, el aire seco, el aire contaminado, las [place] y gritar también pueden causar dolor de garganta. El dolor de garganta puede ser intenso y [place]. Por deshawn, la mayoría de los myriam de garganta desaparecen por sí mismos. Si tiene [employer], el médico podría recetarle antibióticos. La atención de seguimiento es julieta parte clave de ayala tratamiento y seguridad. Asegúrese de hacer y acudir a todas las citas, y llame a ayala médico si está teniendo problemas. También es julieta buena idea saber los resultados de joseph exámenes y mantener julieta lista de los medicamentos que lilly. ¿Cómo puede cuidarse en el hogar? · Si ayala médico le recetó antibióticos, tómelos según las indicaciones. No deje de tomarlos por el hecho de sentirse mejor. Debe pattie todos los antibióticos hasta terminarlos. · Suzanna gárgaras de agua salada tibia julieta vez cada hora para ayudar a reducir la hinchazón y aliviar la molestia. Mezcle 1 cucharadita de sal en 1 taza de agua tibia. · [place] un analgésico (medicamento para el dolor) de venta stacia, fady acetaminofén (Tylenol), ibuprofeno (Advil, Motrin) o naproxeno (Aleve). Silvana y siga todas las instrucciones de la [place]. · Tenga cuidado cuando tome medicamentos de venta stacia para el resfriado o la gripe y Tylenol al [employer]. Muchos de estos medicamentos contienen acetaminofén, o sea, Tylenol. Silvana las etiquetas para asegurarse de que no está tomando julieta dosis mayor que la recomendada. El exceso de acetaminofén (Tylenol) puede ser dañino. · [place] abundantes líquidos. Los líquidos podrían ayudar a aliviar la irritación de la garganta. Beber líquidos calientes, fayd té o sopa, podría ayudarle a reducir el dolor de garganta.   · Chupe pastillas para la garganta de venta stacia para aliviar el dolor. Los caramelos duros o los caramelos regulares para la tos también podrían ayudar. Nunca se los dé a un everett pequeño porque corre el riesgo de atragantarse. · No fume ni permita que otros fumen cerca de usted. Si necesita ayuda para dejar de fumar, hable con ayala médico [employer] y medicamentos para dejar de fumar. Estos pueden aumentar joseph probabilidades de dejar el hábito para siempre. · Use un humidificador o vaporizador para añadir humedad en ayala dormitorio. Siga las instrucciones para limpiar el aparato. ¿Cuándo debe pedir ayuda? Llame a ayala médico ahora mismo o busque atención médica inmediata si:    · Tiene dificultades para tragar o estas empeoran.     · El dolor de garganta empeora mucho en un lado.    Preste especial atención a los cambios en ayala kyung y asegúrese de comunicarse con ayala médico si no mejora fady se esperaba. ¿Dónde puede encontrar más información en inglés? Vaya a http://patricia-nikhil.info/  P[address] en la búsqueda para aprender más acerca de \"Dolor de garganta: Instrucciones de cuidado. \"  Revisado: 28 julio, 2019Versión del contenido: 12.4  © 3180-1618 Healthwise, Incorporated. Las instrucciones de cuidado fueron adaptadas bajo licencia por [employer] (which disclaims liability or warranty for this information). Si usted tiene [employer] afección médica o sobre estas instrucciones, siempre pregunte a ayala profesional de kyung. Healthwise, Incorporated niega toda garantía o responsabilidad por ayala uso de esta información.

## 2020-03-18 NOTE — PROGRESS NOTES
ACM attempted to reach patient for follow up and further screening for COVID 19 after risk factors identified during recent ER visit on 3/17/20. ACM unable to reach the patient.   Message left with ACM contact information for concerns about call

## 2020-03-19 LAB
BACTERIA SPEC CULT: NORMAL
SERVICE CMNT-IMP: NORMAL

## 2020-03-25 ENCOUNTER — PATIENT OUTREACH (OUTPATIENT)
Dept: FAMILY MEDICINE CLINIC | Age: 45
End: 2020-03-25

## 2021-11-05 ENCOUNTER — TRANSCRIBE ORDER (OUTPATIENT)
Dept: SCHEDULING | Age: 46
End: 2021-11-05

## 2021-11-05 DIAGNOSIS — Z12.31 VISIT FOR SCREENING MAMMOGRAM: Primary | ICD-10-CM

## 2021-11-08 ENCOUNTER — TELEPHONE (OUTPATIENT)
Dept: FAMILY PLANNING/WOMEN'S HEALTH CLINIC | Age: 46
End: 2021-11-08

## 2021-11-08 NOTE — TELEPHONE ENCOUNTER
Called patient to confirm upcomming appointment. Patient did not answer the phone. Left voicemail message.

## 2021-11-09 ENCOUNTER — HOSPITAL ENCOUNTER (OUTPATIENT)
Dept: LAB | Age: 46
Discharge: HOME | End: 2021-11-09

## 2021-11-09 ENCOUNTER — OFFICE VISIT (OUTPATIENT)
Dept: FAMILY PLANNING/WOMEN'S HEALTH CLINIC | Age: 46
End: 2021-11-09

## 2021-11-09 ENCOUNTER — HOSPITAL ENCOUNTER (OUTPATIENT)
Dept: MAMMOGRAPHY | Age: 46
Discharge: HOME | End: 2021-11-09

## 2021-11-09 DIAGNOSIS — Z12.31 VISIT FOR SCREENING MAMMOGRAM: ICD-10-CM

## 2021-11-09 DIAGNOSIS — Z01.419 ENCOUNTER FOR WELL WOMAN EXAM: ICD-10-CM

## 2021-11-09 DIAGNOSIS — Z01.419 ENCOUNTER FOR GYNECOLOGICAL EXAMINATION: Primary | ICD-10-CM

## 2021-11-09 PROCEDURE — 77067 SCR MAMMO BI INCL CAD: CPT

## 2021-11-09 PROCEDURE — 87624 HPV HI-RISK TYP POOLED RSLT: CPT

## 2021-11-09 PROCEDURE — 88175 CYTOPATH C/V AUTO FLUID REDO: CPT

## 2021-11-09 NOTE — PROGRESS NOTES
Assessment/Plan:    Diagnoses and all orders for this visit:    1. Encounter for gynecological examination  -     PAP IG, APTIMA HPV AND RFX 16/18,45 ([id number]); Future    2. Encounter for well woman exam    First mammogram today, what to expect was discussed with the pt         BELEN Wheelernamrata Petty expressed understanding of this plan. An AVS was printed and given to the patient.      ----------------------------------------------------------------------    Chief Complaint   Patient presents with    Well Woman       History of Present Illness:  [facility] first visit  She is    Stopped having periods about one year ago  In a safe relationship, no risk of DV, no pain with intercourse  Last pap about 8 years ago she states  No breast complaints. No hx of abnl pap tests      Past Medical History:   Diagnosis Date    H/O seasonal allergies        Current Outpatient Medications   Medication Sig Dispense Refill    diclofenac EC (VOLTAREN) 75 mg EC tablet Take 1 Tab by mouth two (2) times a day. (Patient not taking: Reported on 2021) 30 Tab 0    ibuprofen (MOTRIN) 800 mg tablet Take 1 Tab by mouth every six (6) hours as needed for Pain (Fever). (Patient not taking: Reported on 2021) 30 Tab 0    acetaminophen (TYLENOL EXTRA STRENGTH) 500 mg tablet Take 2 Tabs by mouth every six (6) hours as needed for Pain or Fever. (Patient not taking: Reported on 2021) 60 Tab 0    ibuprofen (MOTRIN) 600 mg tablet Take 1 Tab by mouth every six (6) hours as needed for Pain. [employer] 1 pastilla cada 6 horas (Patient not taking: Reported on 2021) 40 Tab 1    amitriptyline (ELAVIL) 25 mg tablet Take 1 Tab by mouth nightly. [employer] 1 tab por la noche. (Patient not taking: Reported on 2021) 30 Tab 1       No Known Allergies    Social History     Tobacco Use    Smoking status: Never Smoker    Smokeless tobacco: Never Used   Substance Use Topics    Alcohol use: No    Drug use:  No No family history on file. Physical Exam:     There were no vitals taken for this visit. A&Ox3  WDWN NAD  Respirations normal and non labored  Breast exam- donte neg for mass, tenderness, skin color changes, dimpling or retractions. Nipples not inverted today (see my note from 2017)  Pelvic exam- ext neg for lesion or discharge. Cervix and vagina w/out lesion or discharge. Pale mucosa.  Uterus and adnexal exam neg for mass or tenderness

## 2021-11-09 NOTE — PROGRESS NOTES
EVERY WOMANS LIFE HISTORY QUESTIONNAIRE       No Yes Comments   Has a doctor ever seen or felt anything wrong with your breast? [x]                                  []                                     Have you ever had a breast biopsy? [x]                                  []                                          When and where was last mammogram performed? [facility]    Have you ever been told that there was a problem on your mammogram?   No Yes Comments   [x]                                  []                                       Do you have breast implants? No Yes Comments   [x]                                  []                                       When was your last Pap test performed? 8 years ago    Have you ever had an abnormal Pap test?   No Yes Comments   []                                  [x]                                  About 8-10 years ago, not sure of issue. Have you had a hysterectomy? No Yes Comments (why)   [x]                                  []                                       Have you been through menopause? No Yes Date of LMP   [x]                                  []                                  1 year ago     Did your mother take PACO? No Yes Unknown   [x]                                  []                                  X     Do you have a history of HIV exposure? No Yes    [x]                                  []                                       Have you ever been diagnosed with any type of Cancer   No Yes Comments (type,when,where,type of treatment   [x]                                  []                                          Has a family member been diagnosed with breast or ovarian cancer?    No Yes Comments (which family members, and type   [x]                                  []                                       Are you taking hormone replacement therapy (HRT)     No Yes Comments   [x]                                  [] How many times have you been pregnant? 3    Number of live births ? 3    Are you experiencing any of the following? No Yes Comments   Nipple Discharge [x]                                  []                                     Breast Lump/Masses [x]                                  []                                     Breast Skin Changes [x]                                  []                                          No Yes Comments   Vaginal Discharge [x]                                  []                                     Abnormal/unusual vaginal bleeding [x]                                  []                                         Are you experiencing any other health problems? Right arm pain, pt given CVAN info. Pt states not going to crossover anymore. Age at first period? 10  Age at first [address]    Ht--4'10\"    Wt--110

## 2021-11-12 NOTE — PROGRESS NOTES
Neg cytology with heavy inflammatory exudate present, neg hpv.  Lets repeat pap in one year since we don't have previous results, thanks

## 2022-09-03 ENCOUNTER — HOSPITAL ENCOUNTER (EMERGENCY)
Age: 47
Discharge: HOME | End: 2022-09-03
Attending: EMERGENCY MEDICINE

## 2022-09-03 VITALS
DIASTOLIC BLOOD PRESSURE: 85 MMHG | TEMPERATURE: 97.8 F | SYSTOLIC BLOOD PRESSURE: 137 MMHG | RESPIRATION RATE: 18 BRPM | OXYGEN SATURATION: 98 % | HEART RATE: 82 BPM

## 2022-09-03 DIAGNOSIS — J06.9 VIRAL URI: Primary | ICD-10-CM

## 2022-09-03 DIAGNOSIS — J02.9 SORE THROAT: ICD-10-CM

## 2022-09-03 DIAGNOSIS — R21 RASH: ICD-10-CM

## 2022-09-03 LAB — S PYO AG THROAT QL: NEGATIVE

## 2022-09-03 PROCEDURE — 99283 EMERGENCY DEPT VISIT LOW MDM: CPT

## 2022-09-03 PROCEDURE — 87070 CULTURE OTHR SPECIMN AEROBIC: CPT

## 2022-09-03 PROCEDURE — 74011000250 HC RX REV CODE- 250: Performed by: PHYSICIAN ASSISTANT

## 2022-09-03 PROCEDURE — 87880 STREP A ASSAY W/OPTIC: CPT

## 2022-09-03 PROCEDURE — 74011250637 HC RX REV CODE- 250/637: Performed by: PHYSICIAN ASSISTANT

## 2022-09-03 RX ORDER — TETRACAINE HYDROCHLORIDE 5 MG/ML
1 SOLUTION OPHTHALMIC
Status: COMPLETED | OUTPATIENT
Start: 2022-09-03 | End: 2022-09-03

## 2022-09-03 RX ORDER — NAPROXEN 250 MG/1
500 TABLET ORAL
Status: COMPLETED | OUTPATIENT
Start: 2022-09-03 | End: 2022-09-03

## 2022-09-03 RX ADMIN — NAPROXEN 500 MG: 250 TABLET ORAL at 10:32

## 2022-09-03 RX ADMIN — TETRACAINE HYDROCHLORIDE 1 DROP: 5 SOLUTION OPHTHALMIC at 10:32

## 2022-09-03 RX ADMIN — FLUORESCEIN SODIUM 1 STRIP: 1 STRIP OPHTHALMIC at 10:32

## 2022-09-03 NOTE — ED TRIAGE NOTES
figueroa #[id number]    Pt c/o irritation, redness and sensitivity to skin on chest back and neck. Pt states throat has felt swollen. \"Feels like sand is in my eyes\" x 4 days. Denies known allergies to food/meds.

## 2022-09-03 NOTE — ED PROVIDER NOTES
49 y/o female presenting with complaint of skin problem and sore throat. The patient states that 4 days ago she began to notice a rash on her upper body, with associated sore throat, fever and generalized body aches. She took 3 doses of ibuprofen, after which the fevers and body aches resolved. She continues with the rash and sore throat, and notes that her throat pain has been worsening. Today she also began to notice foreign body sensation in her eyes. Her throat pain is currently rated 7/10, described as sore, does not radiate. She denies nasal congestion, cough, eye redness, visual disturbances, vomiting, diarrhea, lightheadedness or syncope. The history is provided by the patient. The history is limited by a language barrier. A  was used. Past Medical History:   Diagnosis Date    H/O seasonal allergies        No past surgical history on file. No family history on file. Social History     Socioeconomic History    Marital status: SINGLE     Spouse name: Not on file    Number of children: Not on file    Years of education: Not on file    Highest education level: Not on file   Occupational History    Not on file   Tobacco Use    Smoking status: Never    Smokeless tobacco: Never   Substance and Sexual Activity    Alcohol use: No    Drug use: No    Sexual activity: Not on file   Other Topics Concern    Not on file   Social History Narrative    Not on file     Social Determinants of Health     Financial Resource Strain: Not on file   Food Insecurity: Not on file   Transportation Needs: Not on file   Physical Activity: Not on file   Stress: Not on file   Social Connections: Not on file   Intimate Partner Violence: Not on file   Housing Stability: Not on file         ALLERGIES: Patient has no known allergies. Review of Systems   Constitutional:  Positive for chills and fever (101 three days ago). HENT:  Positive for sore throat.  Negative for congestion and trouble swallowing. Eyes:  Positive for pain. Negative for redness and visual disturbance. Respiratory:  Negative for cough. Gastrointestinal:  Negative for diarrhea and vomiting. Musculoskeletal:  Positive for myalgias (generalized body aches, resovled). Skin:  Positive for rash. Neurological:  Negative for syncope and light-headedness. All other systems reviewed and are negative. Vitals:    09/03/22 0846   BP: 137/85   Pulse: 82   Resp: 18   Temp: 97.8 °F (36.6 °C)   SpO2: 98%            Physical Exam  Vitals and nursing note reviewed. Constitutional:       General: She is not in acute distress. Appearance: She is well-developed. She is not diaphoretic. HENT:      Head: Normocephalic and atraumatic. Mouth/Throat:      Mouth: Mucous membranes are moist.      Pharynx: Uvula midline. Posterior oropharyngeal erythema present. No pharyngeal swelling, oropharyngeal exudate or uvula swelling. Tonsils: No tonsillar abscesses. Eyes:      Extraocular Movements: Extraocular movements intact. Conjunctiva/sclera: Conjunctivae normal.      Pupils: Pupils are equal, round, and reactive to light. Comments: No corneal abrasions noted under woods lamp with fluorescein stain. Cardiovascular:      Rate and Rhythm: Normal rate. Pulmonary:      Effort: Pulmonary effort is normal. No respiratory distress. Musculoskeletal:      Cervical back: Normal range of motion and neck supple. Skin:     General: Skin is warm and dry. Comments: Generalized fine papular rash on chest, back, and bilateral upper extremities. Neurological:      Mental Status: She is alert and oriented to person, place, and time. MDM         Procedures        49 y/o female presenting with complaint of skin problem and sore throat. History and exam consistent with viral URI with viral exanthem. Rapid strep is negative, throat culture pending.   No physical exam findings of conjunctivitis, corneal abrasions, or periorbital cellulitis. Plan is for discharge home with instructions for continued ibuprofen and Tylenol as needed, PCP follow up if symptoms continue. Strict ED return precautions discussed and provided in writing at time of discharge. The patient verbalized understanding and agreement with this plan.

## 2022-09-05 LAB
BACTERIA SPEC CULT: NORMAL
SERVICE CMNT-IMP: NORMAL

## 2022-10-12 ENCOUNTER — TELEPHONE (OUTPATIENT)
Dept: FAMILY PLANNING/WOMEN'S HEALTH CLINIC | Age: 47
End: 2022-10-12

## 2022-10-18 ENCOUNTER — TELEPHONE (OUTPATIENT)
Dept: FAMILY PLANNING/WOMEN'S HEALTH CLINIC | Age: 47
End: 2022-10-18

## 2022-10-26 NOTE — TELEPHONE ENCOUNTER
Calling patient to offer appt. For screening mammogram and pap smear for next week ar [facility]. No answer. Left message to call our direct line.  Sid Jim RN

## 2022-11-28 ENCOUNTER — TRANSCRIBE ORDER (OUTPATIENT)
Dept: SCHEDULING | Age: 47
End: 2022-11-28

## 2022-11-28 DIAGNOSIS — Z12.31 VISIT FOR SCREENING MAMMOGRAM: Primary | ICD-10-CM

## 2022-11-30 ENCOUNTER — HOSPITAL ENCOUNTER (OUTPATIENT)
Dept: MAMMOGRAPHY | Age: 47
Discharge: HOME | End: 2022-11-30

## 2022-11-30 DIAGNOSIS — Z12.31 VISIT FOR SCREENING MAMMOGRAM: ICD-10-CM

## 2022-11-30 PROCEDURE — 77063 BREAST TOMOSYNTHESIS BI: CPT

## 2023-01-27 ENCOUNTER — TELEPHONE (OUTPATIENT)
Dept: FAMILY PLANNING/WOMEN'S HEALTH CLINIC | Age: 48
End: 2023-01-27

## 2023-01-27 NOTE — TELEPHONE ENCOUNTER
Chart reviewed- pt had screening mammogram 11/2022. Did not come through a [facility] clinic. It is noted that per Angie BEARD results notes patient needs a 1 year repeat pap due 11/2022). Patient has been called unsuccessfully on multiple occasions by [facility] [address] [facility] program staff. A letter asking patient to call back has been printed and will be sent. If patient calls back please screen for program and make a pelvic/pap appt. Only- she will not be due for mammogram until 11/2023 unless she has new breast s/s.  Mahnaz Espino RN

## 2023-05-13 NOTE — DISCHARGE INSTRUCTIONS
Aprenda sobre la fiebre - [ Learning About Fever ]  ¿Qué es la fiebre? La fiebre es julieta temperatura corporal juliocesar. Es julieta de las [employer] que el cuerpo combate enfermedades. La fiebre indica que el cuerpo está reaccionando a julieta infección o a otras enfermedades, tanto leves fady graves. La fiebre es un síntoma, no julieta enfermedad en sí misma. La fiebre puede ser julieta señal de que usted está enfermo, dalila la mayoría de las fiebres no están causadas por un problema grave. Es posible que usted tenga fiebre con julieta enfermedad de clint importancia, fady un resfriado. Dalila, en ocasiones, julieta infección muy grave puede causar poca o nada de fiebre. Es importante considerar otros síntomas, otras afecciones que usted tenga y cómo se siente usted en general. En niños, preste atención a ayala comportamiento y a los síntomas de los que se [place]. ¿Cuál es la temperatura normal del cuerpo? Julieta temperatura corporal normal es de 98.6°F (37°C), aproximadamente. Algunas personas tienen julieta temperatura normal que es un poco más juliocesar o algo más baja que esta. Ayala temperatura puede ser un poco más baja en la mañana que más tarde en el día. Podría elevarse cuando hace ejercicio, lleva ropa gruesa, lilly un baño caliente o cuando hace calor. Ayala temperatura también puede ser diferente dependiendo de cómo la mida. Si mide la temperatura en la boca (oral) o en la axila, puede ser algo más baja que la temperatura central (rectal). ¿Qué es julieta temperatura de fiebre? Julieta temperatura central de 100.4°F (38°C) o superior se considera fiebre. ¿Qué puede causar la fiebre? La fiebre puede ser causada por:  · Infecciones. Esta es la causa más común de la [place]. Los ejemplos de infecciones que pueden provocar fiebre incluyen la gripe, julieta infección de los riñones o la neumonía. · Algunos medicamentos. · Traumatismos o lesiones graves, fady un ataque al corazón, un ataque cerebral, insolación o quemaduras.   · Otras afecciones médicas, fady artritis y algunos tipos de cáncer. ¿Cómo puede tratar la fiebre en el hogar? · Pregúntele a ayala médico si puede pattie un analgésico (medicamento para el dolor) de venta stacia, fady acetaminofén (Tylenol), ibuprofeno (Advil, Motrin) o naproxeno (Aleve). Sea felicitas con los medicamentos. Silvana y siga todas las instrucciones de la [place]. · Dominique abundantes líquidos para prevenir la deshidratación. Opte por beber agua y otros líquidos mann sin cafeína hasta que se sienta mejor. Si tiene julieta enfermedad renal, cardíaca o hepática y tiene que restringir los líquidos, hable con ayala médico antes de aumentar la cantidad de líquido que rafael. La atención de seguimiento es julieta parte clave de ayala tratamiento y seguridad. Asegúrese de hacer y acudir a todas las citas, y llame a ayala médico si está teniendo problemas. También es julieta buena idea saber los resultados de los exámenes y mantener julieta lista de los medicamentos que lilly. ¿Dónde puede encontrar más información en inglés? Kinsey Zarate a http://patricia-nikhil.info/. [place] Late E343 en la búsqueda para aprender más acerca de \"Aprenda sobre la [place] - [ Learning About Fever ]. \"  Revisado: 20 Skip Hong 2017  Versión del contenido: 11.4  © 9111-2841 Healthwise, Incorporated. Las instrucciones de cuidado fueron adaptadas bajo licencia por [employer] (which disclaims liability or warranty for this information). Si usted tiene [place] afección médica o sobre estas instrucciones, siempre pregunte a ayala profesional de kyung. Healthwise, Incorporated niega toda garantía o responsabilidad por ayala uso de esta información. Influenza (gripe): Instrucciones de cuidado - [ Influenza (Flu): Care Instructions ]  Instrucciones de cuidado    La influenza (gripe) es julieta infección de los pulmones y las vías respiratorias. Es causada por el virus de la influenza. Hay diferentes cepas o tipos de virus de la gripe de un año a otro.  A diferencia del resfriado común, la gripe se presenta de [place] repentina y [address], tales fady tos, [place], [place], escalofríos, fatiga y [place], son W[place]ill Muff intensos. Estos síntomas pueden durar hasta 10 días. Aunque la gripe puede hacerle sentirse muy enfermo, por lo general no causa problemas serios de kyung. Por lo general, todo lo que necesita para los síntomas de la gripe es tratamiento en casa. Dalila ayala médico podría recetarle algún medicamento antiviral para prevenir otros problemas de kyung, fady la neumonía. Las [employer] y quienes tienen un problema de kyung prolongado, fady julieta enfermedad pulmonar, tienen el mayor riesgo de desarrollar neumonía u otros problemas de [place]. La atención de seguimiento es julieta parte clave de ayala tratamiento y seguridad. Asegúrese de hacer y acudir a todas las citas, y llame a ayala médico si está teniendo problemas. También es julieta buena idea saber los resultados de los exámenes y mantener julieta lista de los medicamentos que lilly. ¿Cómo puede cuidarse en el hogar? · Descanse bastante. · Dominique abundantes líquidos, suficientes para que ayala orina sea de color amarillo frederick o transparente fady el agua. Si tiene julieta enfermedad del riñón, del corazón o del hígado y tiene que [place] líquidos, hable con ayala médico antes de aumentar ayala consumo. · Si es necesario, tome un analgésico (medicamento para el dolor) de venta stacia, fady acetaminofén (Tylenol), ibuprofeno (Advil, Motrin) o naproxeno (Aleve), para [employer] fiebre, el dolor de [place] y los myriam musculares. Silvana y siga todas las instrucciones de la [place]. Ninguna persona clint de 20 años debe pattie aspirina. Ésta ha sido relacionada con el síndrome de [place], julieta enfermedad grave. · No fume. Fumar puede empeorar la gripe. Si necesita ayuda para dejar de fumar, hable con ayala médico [employer] y medicamentos para dejar de fumar. Éstos pueden aumentar joseph probabilidades de dejar el hábito para siempre.   · Para ayudar a despejar la nariz congestionada, respire aire húmedo de [place] caliente o un lavabo lleno de [place]. · Antes de usar medicamentos para la tos y los resfriados, revise la [place]. Estos medicamentos podrían no ser seguros para los niños pequeños o las personas con ciertos problemas de [place]. · Si le duele la piel alrededor de la nariz y los labios, aplique un poco de vaselina en la corin. · Para aliviar la tos:  Thi Dacia líquidos para aliviar la comezón de garganta. ¨ Chupe pastillas para la tos o caramelos duros comunes. ¨ [place] un medicamento para la tos de venta stacia que contenga dextrometorfano para ayudarle a dormir. Silvana y siga todas las instrucciones de la [place]. ¨ Use julieta almohada extra en la noche para levantar más la william. Lynnd[place] podría ayudarlo a descansar si la tos lo mantiene despierto. · [employer] medicamentos recetados exactamente fady le fueron recetados. Llame a ayala médico si radha estar teniendo problemas con ayala medicamento. Cómo evitar propagar la gripe  · [facility] ashley con regularidad y manténgalas alejadas de ayala preet. · No vaya a la escuela, al Thiago Barry o a otros lugares públicos hasta que se sienta mejor y ayala fiebre haya desaparecido por al menos 24 horas. La fiebre debe zeferino desaparecido por sí misma, sin la ayuda de medicamentos. · Pida a las personas que viven con usted que hablen con joseph médicos sobre la prevención de la gripe. Maxx vez les den algún medicamento antiviral para no contraer ayala gripe. · Para prevenir tener la gripe en el futuro, hágase poner la vacuna contra la gripe cada otoño. Anime a las personas que viven en ayala casa a ponerse la vacuna. · Cúbrase la boca al toser o estornudar. ¿Cuándo debe pedir ayuda? Llame al 911 en cualquier momento que considere que necesita atención de emergencia. Por ejemplo, llame si:  ? · [address] dificultades para respirar. ? Llame a ayala médico ahora mismo o busque atención médica inmediata si:  ?  · Tiene nueva o mayor dificultad para respirar. ? · Le parece que está mucho más enfermo. ? · Se siente muy somnoliento (con sueño) o confuso. ? · Tiene fiebre nueva o más juliocesar. ? · Tiene un salpullido nuevo. ?Preste especial atención a los cambios en ayala kyung y asegúrese de comunicarse con ayala médico si:  ? · Empieza a mejorar y después empeora otra vez.   ? · No está mejorando después de 1 semana. ¿Dónde puede encontrar más información en inglés? Ginny Ring a http://patricia-nikhil.info/. Claribel Kitchen T934 en la búsqueda para aprender más acerca de \"Influenza (gripe): Instrucciones de cuidado - [ Influenza (Flu): Care Instructions ]. \"  Revisado: 12 olson, 2017  Versión del contenido: 11.4  © 5968-5674 Healthwise, Incorporated. Las instrucciones de cuidado fueron adaptadas bajo licencia por [employer] (which disclaims liability or warranty for this information). Si usted tiene [place] afección médica o sobre estas instrucciones, siempre pregunte a ayala profesional de kyung. Healthwise, Incorporated niega toda garantía o responsabilidad por ayala uso de esta información. Dr. Albrecht

## 2023-07-19 ENCOUNTER — HOSPITAL ENCOUNTER (OUTPATIENT)
Facility: HOSPITAL | Age: 48
Discharge: HOME | End: 2023-07-22

## 2023-07-19 DIAGNOSIS — R07.9 CHEST PAIN, UNSPECIFIED TYPE: ICD-10-CM

## 2023-07-19 PROCEDURE — 71046 X-RAY EXAM CHEST 2 VIEWS: CPT

## 2024-01-04 ENCOUNTER — HOSPITAL ENCOUNTER (OUTPATIENT)
Facility: HOSPITAL | Age: 49
Setting detail: SPECIMEN
Discharge: HOME | End: 2024-01-07

## 2024-01-04 DIAGNOSIS — K21.9 GASTROESOPHAGEAL REFLUX DISEASE, UNSPECIFIED WHETHER ESOPHAGITIS PRESENT: ICD-10-CM

## 2024-01-04 PROCEDURE — 85025 COMPLETE CBC W/AUTO DIFF WBC: CPT

## 2024-01-04 PROCEDURE — 83036 HEMOGLOBIN GLYCOSYLATED A1C: CPT

## 2024-01-04 PROCEDURE — 80061 LIPID PANEL: CPT

## 2024-01-04 PROCEDURE — 36415 COLL VENOUS BLD VENIPUNCTURE: CPT

## 2024-01-04 PROCEDURE — 84443 ASSAY THYROID STIM HORMONE: CPT

## 2024-01-04 PROCEDURE — 80053 COMPREHEN METABOLIC PANEL: CPT

## 2024-01-05 LAB
ALBUMIN SERPL-MCNC: 3.8 G/DL (ref 3.5–5)
ALBUMIN/GLOB SERPL: 1 (ref 1.1–2.2)
ALP SERPL-CCNC: 171 U/L (ref 45–117)
ALT SERPL-CCNC: 49 U/L (ref 12–78)
ANION GAP SERPL CALC-SCNC: 2 MMOL/L (ref 5–15)
AST SERPL-CCNC: 26 U/L (ref 15–37)
BASOPHILS # BLD: 0.1 K/UL (ref 0–0.1)
BASOPHILS NFR BLD: 1 % (ref 0–1)
BILIRUB SERPL-MCNC: 0.5 MG/DL (ref 0.2–1)
BUN SERPL-MCNC: 10 MG/DL (ref 6–20)
BUN/CREAT SERPL: 16 (ref 12–20)
CALCIUM SERPL-MCNC: 9.4 MG/DL (ref 8.5–10.1)
CHLORIDE SERPL-SCNC: 107 MMOL/L (ref 97–108)
CHOLEST SERPL-MCNC: 223 MG/DL
CO2 SERPL-SCNC: 30 MMOL/L (ref 21–32)
CREAT SERPL-MCNC: 0.62 MG/DL (ref 0.55–1.02)
DIFFERENTIAL METHOD BLD: NORMAL
EOSINOPHIL # BLD: 0.2 K/UL (ref 0–0.4)
EOSINOPHIL NFR BLD: 3 % (ref 0–7)
ERYTHROCYTE [DISTWIDTH] IN BLOOD BY AUTOMATED COUNT: 12.2 % (ref 11.5–14.5)
EST. AVERAGE GLUCOSE BLD GHB EST-MCNC: 123 MG/DL
GLOBULIN SER CALC-MCNC: 3.9 G/DL (ref 2–4)
GLUCOSE SERPL-MCNC: 182 MG/DL (ref 65–100)
HBA1C MFR BLD: 5.9 % (ref 4–5.6)
HCT VFR BLD AUTO: 43.6 % (ref 35–47)
HDLC SERPL-MCNC: 53 MG/DL
HDLC SERPL: 4.2 (ref 0–5)
HGB BLD-MCNC: 14.5 G/DL (ref 11.5–16)
IMM GRANULOCYTES # BLD AUTO: 0 K/UL (ref 0–0.04)
IMM GRANULOCYTES NFR BLD AUTO: 0 % (ref 0–0.5)
LDLC SERPL CALC-MCNC: 121.2 MG/DL (ref 0–100)
LYMPHOCYTES # BLD: 2.1 K/UL (ref 0.8–3.5)
LYMPHOCYTES NFR BLD: 33 % (ref 12–49)
MCH RBC QN AUTO: 30.9 PG (ref 26–34)
MCHC RBC AUTO-ENTMCNC: 33.3 G/DL (ref 30–36.5)
MCV RBC AUTO: 93 FL (ref 80–99)
MONOCYTES # BLD: 0.3 K/UL (ref 0–1)
MONOCYTES NFR BLD: 5 % (ref 5–13)
NEUTS SEG # BLD: 3.8 K/UL (ref 1.8–8)
NEUTS SEG NFR BLD: 58 % (ref 32–75)
NRBC # BLD: 0 K/UL (ref 0–0.01)
NRBC BLD-RTO: 0 PER 100 WBC
PLATELET # BLD AUTO: 302 K/UL (ref 150–400)
PMV BLD AUTO: 11.4 FL (ref 8.9–12.9)
POTASSIUM SERPL-SCNC: 4.4 MMOL/L (ref 3.5–5.1)
PROT SERPL-MCNC: 7.7 G/DL (ref 6.4–8.2)
RBC # BLD AUTO: 4.69 M/UL (ref 3.8–5.2)
SODIUM SERPL-SCNC: 139 MMOL/L (ref 136–145)
TRIGL SERPL-MCNC: 244 MG/DL
TSH SERPL DL<=0.05 MIU/L-ACNC: 0.87 UIU/ML (ref 0.36–3.74)
VLDLC SERPL CALC-MCNC: 48.8 MG/DL
WBC # BLD AUTO: 6.5 K/UL (ref 3.6–11)

## 2024-01-08 ENCOUNTER — HOSPITAL ENCOUNTER (OUTPATIENT)
Facility: HOSPITAL | Age: 49
Setting detail: SPECIMEN
Discharge: HOME | End: 2024-01-11

## 2024-01-08 ENCOUNTER — NURSE ONLY (OUTPATIENT)
Age: 49
End: 2024-01-08

## 2024-01-08 DIAGNOSIS — Z12.11 SCREEN FOR COLON CANCER: ICD-10-CM

## 2024-01-08 DIAGNOSIS — Z71.89 COUNSELING AND COORDINATION OF CARE: Primary | ICD-10-CM

## 2024-01-08 PROCEDURE — 87338 HPYLORI STOOL AG IA: CPT

## 2024-01-08 NOTE — PROGRESS NOTES
Patient presented to clinic for lab drop off. Name and  verified. Labs obtained: H pylori and FIT testing kit. Essie John RN

## 2024-01-10 LAB — HEMOCCULT STL QL IA: NEGATIVE

## 2024-01-11 ENCOUNTER — TELEPHONE (OUTPATIENT)
Age: 49
End: 2024-01-11

## 2024-01-11 DIAGNOSIS — A04.8 H. PYLORI INFECTION: Primary | ICD-10-CM

## 2024-01-11 LAB
H PYLORI AG STL QL IA: POSITIVE
SPECIMEN SOURCE: ABNORMAL

## 2024-01-11 RX ORDER — CLARITHROMYCIN 500 MG/1
500 TABLET, COATED ORAL 2 TIMES DAILY
Qty: 28 TABLET | Refills: 0 | Status: SHIPPED | OUTPATIENT
Start: 2024-01-11 | End: 2024-01-25

## 2024-01-11 RX ORDER — AMOXICILLIN 500 MG/1
1000 CAPSULE ORAL 2 TIMES DAILY
Qty: 56 CAPSULE | Refills: 0 | Status: SHIPPED | OUTPATIENT
Start: 2024-01-11 | End: 2024-01-25

## 2024-01-11 RX ORDER — PANTOPRAZOLE SODIUM 40 MG/1
40 TABLET, DELAYED RELEASE ORAL 2 TIMES DAILY
Qty: 28 TABLET | Refills: 0 | Status: SHIPPED | OUTPATIENT
Start: 2024-01-11 | End: 2024-01-25

## 2024-02-06 ENCOUNTER — NURSE ONLY (OUTPATIENT)
Age: 49
End: 2024-02-06

## 2024-02-06 ENCOUNTER — TELEPHONE (OUTPATIENT)
Age: 49
End: 2024-02-06

## 2024-02-06 DIAGNOSIS — A04.8 H. PYLORI INFECTION: Primary | ICD-10-CM

## 2024-02-06 DIAGNOSIS — K21.9 GASTROESOPHAGEAL REFLUX DISEASE, UNSPECIFIED WHETHER ESOPHAGITIS PRESENT: ICD-10-CM

## 2024-02-07 RX ORDER — CLARITHROMYCIN 500 MG/1
500 TABLET, COATED ORAL 2 TIMES DAILY
Qty: 28 TABLET | Refills: 0 | Status: CANCELLED | OUTPATIENT
Start: 2024-02-07 | End: 2024-02-21

## 2024-02-07 RX ORDER — AMOXICILLIN 500 MG/1
CAPSULE ORAL
Qty: 56 CAPSULE | Refills: 0 | Status: CANCELLED | OUTPATIENT
Start: 2024-02-07

## 2024-02-07 RX ORDER — PANTOPRAZOLE SODIUM 40 MG/1
40 TABLET, DELAYED RELEASE ORAL
Qty: 30 TABLET | Refills: 3 | Status: CANCELLED | OUTPATIENT
Start: 2024-02-07

## 2024-02-07 NOTE — PROGRESS NOTES
Chief Complaint   Patient presents with    Discuss Labs     Colon cancer screening and blood lab results.        [facility] services:  [id number].  Chelsi Brady LPN    Patient name and date of birth verified by .  Patient given letters of lab results and colon cancer screening and H. Pylori stool testing .  Patient informed that several nurses have tried several times to contact her to give lab results as well as sent coupons to mobile phone number on file for the medications prescribed.  Patient stated \" I never received anything.\"  Chelsi Brady LPN    Reviewed with patient the lab results and recommendations noted by Dr. Sandoval.  Informed that her H. Pylori stool test was positive and she must take a regimen of medications to help symptoms.  Reviewed with the patient how to take the medications prescribed by Dr. Sandoval for the positive H. Pylori, printed out coupons to give to patient to present to pharmacy for medication discount.  Patient verbalized understanding of all information given at time of visit.  Informed that prescription will be sent to Dr. Sandoval to update being that the original prescriptions has ended due to the time frame.  Chelsi Brady LPN

## 2024-06-10 ENCOUNTER — OFFICE VISIT (OUTPATIENT)
Age: 49
End: 2024-06-10

## 2024-06-10 ENCOUNTER — HOSPITAL ENCOUNTER (OUTPATIENT)
Facility: HOSPITAL | Age: 49
Setting detail: SPECIMEN
Discharge: HOME | End: 2024-06-13

## 2024-06-10 VITALS
TEMPERATURE: 98.7 F | BODY MASS INDEX: 24.59 KG/M2 | DIASTOLIC BLOOD PRESSURE: 77 MMHG | WEIGHT: 114 LBS | OXYGEN SATURATION: 97 % | SYSTOLIC BLOOD PRESSURE: 122 MMHG | HEART RATE: 75 BPM

## 2024-06-10 DIAGNOSIS — E78.5 HYPERLIPIDEMIA, UNSPECIFIED HYPERLIPIDEMIA TYPE: ICD-10-CM

## 2024-06-10 DIAGNOSIS — R73.03 PREDIABETES: ICD-10-CM

## 2024-06-10 DIAGNOSIS — K04.7 DENTAL ABSCESS: ICD-10-CM

## 2024-06-10 DIAGNOSIS — E78.5 HYPERLIPIDEMIA, UNSPECIFIED HYPERLIPIDEMIA TYPE: Primary | ICD-10-CM

## 2024-06-10 PROCEDURE — 99213 OFFICE O/P EST LOW 20 MIN: CPT | Performed by: FAMILY MEDICINE

## 2024-06-10 PROCEDURE — 83036 HEMOGLOBIN GLYCOSYLATED A1C: CPT

## 2024-06-10 PROCEDURE — 80061 LIPID PANEL: CPT

## 2024-06-10 PROCEDURE — 36415 COLL VENOUS BLD VENIPUNCTURE: CPT

## 2024-06-10 RX ORDER — AMOXICILLIN 500 MG/1
500 CAPSULE ORAL 3 TIMES DAILY
Qty: 15 CAPSULE | Refills: 0 | Status: SHIPPED | OUTPATIENT
Start: 2024-06-10 | End: 2024-06-15

## 2024-06-10 SDOH — HEALTH STABILITY: PHYSICAL HEALTH: ON AVERAGE, HOW MANY MINUTES DO YOU ENGAGE IN EXERCISE AT THIS LEVEL?: 10 MIN

## 2024-06-10 SDOH — HEALTH STABILITY: PHYSICAL HEALTH: ON AVERAGE, HOW MANY DAYS PER WEEK DO YOU ENGAGE IN MODERATE TO STRENUOUS EXERCISE (LIKE A BRISK WALK)?: 5 DAYS

## 2024-06-10 ASSESSMENT — PATIENT HEALTH QUESTIONNAIRE - PHQ9
SUM OF ALL RESPONSES TO PHQ QUESTIONS 1-9: 1
SUM OF ALL RESPONSES TO PHQ QUESTIONS 1-9: 1
2. FEELING DOWN, DEPRESSED OR HOPELESS: SEVERAL DAYS
SUM OF ALL RESPONSES TO PHQ9 QUESTIONS 1 & 2: 1
SUM OF ALL RESPONSES TO PHQ QUESTIONS 1-9: 1
1. LITTLE INTEREST OR PLEASURE IN DOING THINGS: NOT AT ALL
SUM OF ALL RESPONSES TO PHQ QUESTIONS 1-9: 1

## 2024-06-10 ASSESSMENT — ENCOUNTER SYMPTOMS
VOMITING: 0
COLOR CHANGE: 0
SHORTNESS OF BREATH: 0
BACK PAIN: 0

## 2024-06-10 ASSESSMENT — LIFESTYLE VARIABLES
HOW OFTEN DO YOU HAVE A DRINK CONTAINING ALCOHOL: NEVER
HOW MANY STANDARD DRINKS CONTAINING ALCOHOL DO YOU HAVE ON A TYPICAL DAY: PATIENT DOES NOT DRINK

## 2024-06-10 NOTE — PROGRESS NOTES
Thelma Britton (:  1975) is a 48 y.o. female,Established patient, here for evaluation of the following chief complaint(s):  Abdominal Pain (Follow up)      Assessment & Plan   Thelma was seen today for abdominal pain.    Diagnoses and all orders for this visit:    Hyperlipidemia, unspecified hyperlipidemia type  -     Lipid Panel; [facility]  -     [facility] - Michelle Samaniego RD, [facility], [facility]    Dental abscess  -     amoxicillin (AMOXIL) 500 MG capsule; Take 1 capsule by mouth 3 times daily for 5 days    Prediabetes  -     Hemoglobin A1C; Future  -     [facility] Michelle Puente RD, [facility], [facility]    48 year old female with hyperlipidemia and prediabetes,  has gained weight, states she would like help with dietary recommendations  Dental abscess, start amoxicillin, will also provide her with dental resources    Subjective   HPI  Patient states she took the medications for h. Pylori.  States her symptoms seem to have improve.  But about 1 month ago had low abdominal pain but now is better.    States she has tried to eat better but sometimes she eats more than what she should.    Review of Systems   HENT:  Negative for congestion, dental problem and ear discharge.         Dental pain/abscess     Respiratory:  Negative for shortness of breath.    Cardiovascular:  Negative for chest pain.   Gastrointestinal:  Negative for vomiting.   Genitourinary:  Negative for difficulty urinating and dyspareunia.   Musculoskeletal:  Negative for arthralgias, back pain and gait problem.   Skin:  Negative for color change and pallor.   /77 (Site: Right Upper Arm, Position: Sitting, Cuff Size: Large Adult)   Pulse 75   Temp 98.7 °F (37.1 °C) (Temporal)   Wt 51.7 kg (114 lb)   SpO2 97%   BMI 24.59 kg/m²   Objective   Physical Exam  Constitutional:       General: She is not in acute distress.  HENT:      Head: Normocephalic.      Right Ear: Tympanic

## 2024-06-10 NOTE — PROGRESS NOTES
Coordination of Care  1. Have you been to the ER, urgent care clinic since your last visit?  Hospitalized since your last visit? no    2. Have you seen or consulted any other health care providers outside of the [facility] since your last visit?  Include any pap smears or colon screening. no    Does the patient need refills? yes    Learning Assessment Complete? yes  Depression Screening complete in the past 12 months? yes

## 2024-06-10 NOTE — PROGRESS NOTES
Pt's name and  verified. AVS provided. Medication reviewed and education provided.Good Rx provided to the patient.Dental resource is provided also. Patient instructed to make a 3 months follow up. [facility] resources provided to the patient. Patient verbalized understanding. Time allowed for questions, no questions at this time. Jazzy Walls LPN

## 2024-06-11 LAB
CHOLEST SERPL-MCNC: 230 MG/DL
EST. AVERAGE GLUCOSE BLD GHB EST-MCNC: 120 MG/DL
HBA1C MFR BLD: 5.8 % (ref 4–5.6)
HDLC SERPL-MCNC: 50 MG/DL
HDLC SERPL: 4.6 (ref 0–5)
LDLC SERPL CALC-MCNC: 142.4 MG/DL (ref 0–100)
TRIGL SERPL-MCNC: 188 MG/DL
VLDLC SERPL CALC-MCNC: 37.6 MG/DL

## 2024-08-08 ENCOUNTER — TELEPHONE (OUTPATIENT)
Age: 49
End: 2024-08-08

## 2024-12-26 ENCOUNTER — IMMUNIZATION (OUTPATIENT)
Age: 49
End: 2024-12-26

## 2024-12-26 DIAGNOSIS — Z23 IMMUNIZATION DUE: Primary | ICD-10-CM

## 2024-12-26 NOTE — PROGRESS NOTES
Thelma Britton requests Flu vaccine. Provider cleared patient to receive vaccines today. Patient confirmed name and . Patient in agreement with receiving vaccines today. Patient denies fever, egg allergy, or reactions to any past immunization.  Immunization given per protocol and recorded in VIIS and EMR.  Vaccine Information Sheet given to patient and possible side effects explained.  Reviewed signs/symptoms of allergic reaction indicating the need to be seen in ER. Patient advised that if signs/symptoms of an allergic reaction develop, she should call 911 or have someone drive her to the hospital. Patient verbalized understanding. Vaccine administered in right deltoid. Patient had no adverse reaction at time of discharge.  Due to language barrier, an  (ID: [id number] ) assisted during this encounter.  RUBIO LOPEZ RN

## 2025-01-31 ENCOUNTER — OFFICE VISIT (OUTPATIENT)
Age: 50
End: 2025-01-31

## 2025-01-31 ENCOUNTER — HOSPITAL ENCOUNTER (OUTPATIENT)
Facility: HOSPITAL | Age: 50
Setting detail: SPECIMEN
Discharge: HOME | End: 2025-02-03

## 2025-01-31 VITALS
HEIGHT: 57 IN | TEMPERATURE: 98.2 F | OXYGEN SATURATION: 96 % | BODY MASS INDEX: 25.28 KG/M2 | HEART RATE: 76 BPM | DIASTOLIC BLOOD PRESSURE: 62 MMHG | SYSTOLIC BLOOD PRESSURE: 100 MMHG | WEIGHT: 117.2 LBS

## 2025-01-31 DIAGNOSIS — R10.9 ABDOMINAL PAIN IN FEMALE: ICD-10-CM

## 2025-01-31 DIAGNOSIS — R73.03 PREDIABETES: ICD-10-CM

## 2025-01-31 DIAGNOSIS — R73.03 PREDIABETES: Primary | ICD-10-CM

## 2025-01-31 LAB
EST. AVERAGE GLUCOSE BLD GHB EST-MCNC: 128 MG/DL
HBA1C MFR BLD: 6.1 % (ref 4–5.6)

## 2025-01-31 PROCEDURE — 99213 OFFICE O/P EST LOW 20 MIN: CPT | Performed by: NURSE PRACTITIONER

## 2025-01-31 PROCEDURE — 83036 HEMOGLOBIN GLYCOSYLATED A1C: CPT

## 2025-01-31 SDOH — HEALTH STABILITY: MENTAL HEALTH: HOW OFTEN DO YOU HAVE A DRINK CONTAINING ALCOHOL?: NEVER

## 2025-01-31 SDOH — SOCIAL STABILITY: SOCIAL INSECURITY: WITHIN THE LAST YEAR, HAVE YOU BEEN AFRAID OF YOUR PARTNER OR EX-PARTNER?: NO

## 2025-01-31 SDOH — HEALTH STABILITY: MENTAL HEALTH: HOW MANY STANDARD DRINKS CONTAINING ALCOHOL DO YOU HAVE ON A TYPICAL DAY?: PATIENT DOES NOT DRINK

## 2025-01-31 SDOH — ECONOMIC STABILITY: FOOD INSECURITY: WITHIN THE PAST 12 MONTHS, THE FOOD YOU BOUGHT JUST DIDN'T LAST AND YOU DIDN'T HAVE MONEY TO GET MORE.: SOMETIMES TRUE

## 2025-01-31 SDOH — SOCIAL STABILITY: SOCIAL NETWORK: ARE YOU MARRIED, WIDOWED, DIVORCED, SEPARATED, NEVER MARRIED, OR LIVING WITH A PARTNER?: LIVING WITH PARTNER

## 2025-01-31 SDOH — HEALTH STABILITY: PHYSICAL HEALTH: ON AVERAGE, HOW MANY DAYS PER WEEK DO YOU ENGAGE IN MODERATE TO STRENUOUS EXERCISE (LIKE A BRISK WALK)?: 0 DAYS

## 2025-01-31 SDOH — HEALTH STABILITY: MENTAL HEALTH
STRESS IS WHEN SOMEONE FEELS TENSE, NERVOUS, ANXIOUS, OR CAN'T SLEEP AT NIGHT BECAUSE THEIR MIND IS TROUBLED. HOW STRESSED ARE YOU?: TO SOME EXTENT

## 2025-01-31 SDOH — ECONOMIC STABILITY: INCOME INSECURITY: IN THE LAST 12 MONTHS, WAS THERE A TIME WHEN YOU WERE NOT ABLE TO PAY THE MORTGAGE OR RENT ON TIME?: NO

## 2025-01-31 SDOH — SOCIAL STABILITY: SOCIAL INSECURITY: WITHIN THE LAST YEAR, HAVE YOU BEEN HUMILIATED OR EMOTIONALLY ABUSED IN OTHER WAYS BY YOUR PARTNER OR EX-PARTNER?: NO

## 2025-01-31 SDOH — SOCIAL STABILITY: SOCIAL NETWORK: HOW OFTEN DO YOU ATTENT MEETINGS OF THE CLUB OR ORGANIZATION YOU BELONG TO?: NEVER

## 2025-01-31 SDOH — SOCIAL STABILITY: SOCIAL INSECURITY
WITHIN THE LAST YEAR, HAVE YOU BEEN KICKED, HIT, SLAPPED, OR OTHERWISE PHYSICALLY HURT BY YOUR PARTNER OR EX-PARTNER?: NO

## 2025-01-31 SDOH — ECONOMIC STABILITY: FOOD INSECURITY: WITHIN THE PAST 12 MONTHS, YOU WORRIED THAT YOUR FOOD WOULD RUN OUT BEFORE YOU GOT MONEY TO BUY MORE.: SOMETIMES TRUE

## 2025-01-31 SDOH — SOCIAL STABILITY: SOCIAL NETWORK
IN A TYPICAL WEEK, HOW MANY TIMES DO YOU TALK ON THE PHONE WITH FAMILY, FRIENDS, OR NEIGHBORS?: MORE THAN THREE TIMES A WEEK

## 2025-01-31 SDOH — SOCIAL STABILITY: SOCIAL NETWORK: HOW OFTEN DO YOU ATTEND CHURCH OR RELIGIOUS SERVICES?: 1 TO 4 TIMES PER YEAR

## 2025-01-31 SDOH — SOCIAL STABILITY: SOCIAL INSECURITY
WITHIN THE LAST YEAR, HAVE TO BEEN RAPED OR FORCED TO HAVE ANY KIND OF SEXUAL ACTIVITY BY YOUR PARTNER OR EX-PARTNER?: NO

## 2025-01-31 SDOH — ECONOMIC STABILITY: INCOME INSECURITY: HOW HARD IS IT FOR YOU TO PAY FOR THE VERY BASICS LIKE FOOD, HOUSING, MEDICAL CARE, AND HEATING?: SOMEWHAT HARD

## 2025-01-31 SDOH — SOCIAL STABILITY: SOCIAL NETWORK
DO YOU BELONG TO ANY CLUBS OR ORGANIZATIONS SUCH AS CHURCH GROUPS UNIONS, FRATERNAL OR ATHLETIC GROUPS, OR SCHOOL GROUPS?: NO

## 2025-01-31 SDOH — ECONOMIC STABILITY: TRANSPORTATION INSECURITY
IN THE PAST 12 MONTHS, HAS THE LACK OF TRANSPORTATION KEPT YOU FROM MEDICAL APPOINTMENTS OR FROM GETTING MEDICATIONS?: NO

## 2025-01-31 SDOH — ECONOMIC STABILITY: TRANSPORTATION INSECURITY
IN THE PAST 12 MONTHS, HAS LACK OF TRANSPORTATION KEPT YOU FROM MEETINGS, WORK, OR FROM GETTING THINGS NEEDED FOR DAILY LIVING?: NO

## 2025-01-31 SDOH — HEALTH STABILITY: PHYSICAL HEALTH: ON AVERAGE, HOW MANY MINUTES DO YOU ENGAGE IN EXERCISE AT THIS LEVEL?: 0 MIN

## 2025-01-31 SDOH — SOCIAL STABILITY: SOCIAL NETWORK: HOW OFTEN DO YOU GET TOGETHER WITH FRIENDS OR RELATIVES?: NEVER

## 2025-01-31 ASSESSMENT — PATIENT HEALTH QUESTIONNAIRE - PHQ9
1. LITTLE INTEREST OR PLEASURE IN DOING THINGS: SEVERAL DAYS
SUM OF ALL RESPONSES TO PHQ QUESTIONS 1-9: 2
SUM OF ALL RESPONSES TO PHQ9 QUESTIONS 1 & 2: 2
2. FEELING DOWN, DEPRESSED OR HOPELESS: SEVERAL DAYS

## 2025-01-31 NOTE — PROGRESS NOTES
Pt's name and  verified. AVS provided. Pt informed of ordered CT and pt instructed to obtain. Imaging center list, order requisition, and financial letter provided. Called [facility]. Pt rep scheduled the patient for an appt on 25  at 1630 at [facility].  All special instructions, patient is to arrive 30 minutes early. Informed that the patient is uninsured, in the process of applying for the Care Card. She made a note of that in the system. Patient was informed that these services are NOT free. Care Card process was explained to patient, including that she might receive a bill. Patient was informed that once bill is received that she is to set up an appt with our O.W to start on the financial assistance application that is based on the patient's income. Pt verbalizes understanding. Pt instructed to schedule lab only appointment for ordered labwork. Pt also instructed to schedule in clinic follow up 3 days after CT per provider. Pt verbalizes understanding. Time allowed for questions, all questions answered at this time. Christy Cancino RN

## 2025-01-31 NOTE — PROGRESS NOTES
2025 : Traci , CT scan with contrast, cbc with diff, esr  Thelma Britton (: 1975) is a 49 y.o. female, established patient, here for evaluation of the following chief complaint(s):  Follow-up (prediabetes, hyperlipidemia)  ASSESSMENT/PLAN: A1c h pylori. Abdominal pain differential would include diverticulitis.  Below is the assessment and plan developed based on review of pertinent history, physical exam, labs, studies, and medications.   1. Prediabetes  -     Hemoglobin A1C; Future  2. Abdominal pain in female  -     CT ABDOMEN PELVIS W IV CONTRAST Additional Contrast? Radiologist Recommendation; Future  -     CBC with Auto Differential; Future  -     Sedimentation Rate; Future  Burning in her stomach on the left lower. After eating.  Feels a cutting sensation.  She can't drink milk.  She will need GI for colonoscopy and needs treatment for diverticulitis.  Will also need to treat h pylori  Return for schedule CT, get labs done (ordered); LK 3 days after acute care slot .  SUBJECTIVE/OBJECTIVE:  HPI Saw [facility] in 2024.  Review of Systems + abdominal pain; Negative for fever and unexpected weight change, shortness of breath, leg swelling, myalgias, dizziness. Negative for polyuria/polydipsia, chest pain, dyspnea, TIA's, numbness/tingling/pain in extremities, unusual visual symptoms, hypoglycemia symptoms, medication side effects.  2-3 times a day but the problem is that the bowels don't move well. For the last 2 months.  No gas, drinking a lot of water.  No results found for any visits on 25.  No results found for: \"ALBCREAT\"  Lab Results   Component Value Date    LABA1C 5.8 (H) 06/10/2024    LABA1C 5.9 (H) 2024    LABGLOM >60 2024    CREATININE 0.62 2024    ALT 49 2024    AST 26 2024    ALKPHOS 171 (H) 2024    BILITOT 0.5 2024    CHOL 230 (H) 06/10/2024    TRIG 188 (H) 06/10/2024    HDL 50 06/10/2024    .4 (H)

## 2025-01-31 NOTE — PROGRESS NOTES
Chief Complaint   Patient presents with    Follow-up     prediabetes, hyperlipidemia     /62 (Site: Left Upper Arm, Position: Sitting, Cuff Size: Medium Adult)   Pulse 76   Temp 98.2 °F (36.8 °C) (Temporal)   Ht 1.45 m (4' 9.09\")   Wt 53.2 kg (117 lb 3.2 oz)   SpO2 96%   BMI 25.28 kg/m²   \"Have you been to the ER, urgent care clinic since your last visit?  Hospitalized since your last visit?\"    NO    “Have you seen or consulted any other health care providers outside our system since your last visit?”    NO    Have you had a mammogram?”   NO    Date of last Mammogram: 11/30/2022       “Have you had a colorectal cancer screening such as a colonoscopy/FIT/Cologuard?    NO    No colonoscopy on file  No cologuard on file  Date of last FIT: 1/8/2024   No flexible sigmoidoscopy on file             details…

## 2025-02-10 ENCOUNTER — HOSPITAL ENCOUNTER (OUTPATIENT)
Facility: HOSPITAL | Age: 50
Setting detail: SPECIMEN
Discharge: HOME | End: 2025-02-13

## 2025-02-10 ENCOUNTER — LAB (OUTPATIENT)
Age: 50
End: 2025-02-10

## 2025-02-10 DIAGNOSIS — R10.9 ABDOMINAL PAIN IN FEMALE: ICD-10-CM

## 2025-02-10 LAB
BASOPHILS # BLD: 0.05 K/UL (ref 0–0.1)
BASOPHILS NFR BLD: 0.9 % (ref 0–1)
DIFFERENTIAL METHOD BLD: NORMAL
EOSINOPHIL # BLD: 0.16 K/UL (ref 0–0.4)
EOSINOPHIL NFR BLD: 2.8 % (ref 0–7)
ERYTHROCYTE [DISTWIDTH] IN BLOOD BY AUTOMATED COUNT: 12.2 % (ref 11.5–14.5)
ERYTHROCYTE [SEDIMENTATION RATE] IN BLOOD: 19 MM/HR (ref 0–30)
HCT VFR BLD AUTO: 42.3 % (ref 35–47)
HGB BLD-MCNC: 13.8 G/DL (ref 11.5–16)
IMM GRANULOCYTES # BLD AUTO: 0.03 K/UL (ref 0–0.04)
IMM GRANULOCYTES NFR BLD AUTO: 0.5 % (ref 0–0.5)
LYMPHOCYTES # BLD: 2.41 K/UL (ref 0.8–3.5)
LYMPHOCYTES NFR BLD: 42.4 % (ref 12–49)
MCH RBC QN AUTO: 29.9 PG (ref 26–34)
MCHC RBC AUTO-ENTMCNC: 32.6 G/DL (ref 30–36.5)
MCV RBC AUTO: 91.6 FL (ref 80–99)
MONOCYTES # BLD: 0.43 K/UL (ref 0–1)
MONOCYTES NFR BLD: 7.6 % (ref 5–13)
NEUTS SEG # BLD: 2.61 K/UL (ref 1.8–8)
NEUTS SEG NFR BLD: 45.8 % (ref 32–75)
NRBC # BLD: 0 K/UL (ref 0–0.01)
NRBC BLD-RTO: 0 PER 100 WBC
PLATELET # BLD AUTO: 275 K/UL (ref 150–400)
PMV BLD AUTO: 11.4 FL (ref 8.9–12.9)
RBC # BLD AUTO: 4.62 M/UL (ref 3.8–5.2)
WBC # BLD AUTO: 5.7 K/UL (ref 3.6–11)

## 2025-02-10 PROCEDURE — 85652 RBC SED RATE AUTOMATED: CPT

## 2025-02-10 PROCEDURE — 85025 COMPLETE CBC W/AUTO DIFF WBC: CPT

## 2025-02-10 NOTE — PROGRESS NOTES
Pt presented to lab for specimen collection, per provider orders. Pt's name and  was verified; no complications during or after specimen collection. Pt had no questions or concerns.     Collection performed by: Irene HERRERA MA

## 2025-02-18 ENCOUNTER — HOSPITAL ENCOUNTER (OUTPATIENT)
Facility: HOSPITAL | Age: 50
Discharge: HOME | End: 2025-02-21

## 2025-02-18 DIAGNOSIS — R10.9 ABDOMINAL PAIN IN FEMALE: ICD-10-CM

## 2025-02-18 PROCEDURE — 6360000004 HC RX CONTRAST MEDICATION: Performed by: EMERGENCY MEDICINE

## 2025-02-18 PROCEDURE — 74177 CT ABD & PELVIS W/CONTRAST: CPT

## 2025-02-18 RX ORDER — IOPAMIDOL 755 MG/ML
100 INJECTION, SOLUTION INTRAVASCULAR
Status: COMPLETED | OUTPATIENT
Start: 2025-02-18 | End: 2025-02-18

## 2025-02-18 RX ADMIN — IOPAMIDOL 100 ML: 755 INJECTION, SOLUTION INTRAVENOUS at 16:45

## 2025-02-28 ENCOUNTER — OFFICE VISIT (OUTPATIENT)
Age: 50
End: 2025-02-28

## 2025-02-28 VITALS
WEIGHT: 118.8 LBS | DIASTOLIC BLOOD PRESSURE: 69 MMHG | SYSTOLIC BLOOD PRESSURE: 106 MMHG | BODY MASS INDEX: 25.63 KG/M2 | OXYGEN SATURATION: 95 % | TEMPERATURE: 97.7 F | HEART RATE: 73 BPM

## 2025-02-28 DIAGNOSIS — R73.03 PREDIABETES: ICD-10-CM

## 2025-02-28 DIAGNOSIS — K52.9 COLITIS: Primary | ICD-10-CM

## 2025-02-28 LAB — GLUCOSE, POC: NORMAL MG/DL

## 2025-02-28 ASSESSMENT — PATIENT HEALTH QUESTIONNAIRE - PHQ9
1. LITTLE INTEREST OR PLEASURE IN DOING THINGS: NOT AT ALL
SUM OF ALL RESPONSES TO PHQ QUESTIONS 1-9: 0
2. FEELING DOWN, DEPRESSED OR HOPELESS: NOT AT ALL
SUM OF ALL RESPONSES TO PHQ QUESTIONS 1-9: 0

## 2025-02-28 NOTE — PROGRESS NOTES
session code [id number]   Chief Complaint   Patient presents with    Abdominal Pain     Follow up for CT and lab results      Vitals:    02/28/25 1538   BP: 106/69   Site: Left Upper Arm   Position: Sitting   Pulse: 73   Temp: 97.7 °F (36.5 °C)   TempSrc: Temporal   SpO2: 95%   Weight: 53.9 kg (118 lb 12.8 oz)     Results for orders placed or performed in visit on 02/28/25   AMB POC GLUCOSE BLOOD, BY GLUCOSE MONITORING DEVICE   Result Value Ref Range    Glucose,  nf MG/DL         \"Have you been to the ER, urgent care clinic since your last visit?  Hospitalized since your last visit?\"    NO    “Have you seen or consulted any other health care providers outside our system since your last visit?”    NO    Have you had a mammogram?”   NO    Date of last Mammogram: 11/30/2022       “Have you had a colorectal cancer screening such as a colonoscopy/FIT/Cologuard?    NO    No colonoscopy on file  No cologuard on file  Date of last FIT: 1/8/2024   No flexible sigmoidoscopy on file

## 2025-02-28 NOTE — PROGRESS NOTES
Pt's name and  verified. AVS provided. Pt informed of referral to GI through [facility]. Patient was advised that she will be contacted by a [facility] Coordinator in regards to her gastroenterology referral. Patient was notified that [facility] has its own financial screening. Pt instructed to schedule follow up in 2 months per provider. Pt verbalizes understanding. Time allowed for questions, all questions answered.  Denise assisted.  Christy Cancino RN

## 2025-02-28 NOTE — PROGRESS NOTES
8:07 AM EST 4:41 PM  2025 : Denise Britton (: 1975) is a 49 y.o. female,  established patient, here for evaluation of the following chief complaint(s):  Abdominal Pain (Follow up for CT and lab results )    ASSESSMENT/PLAN:  Below is the assessm-Perhaps pain coming from fecal stasis and/or uterine fibroids.ent and plan developed based on review of pertinent history, physical exam, labs, studies, and medications.  1. Colitis  -     Amb External Referral To Gastroenterology  2. Prediabetes  -     AMB POC GLUCOSE BLOOD, BY GLUCOSE MONITORING DEVICE    Return for 2 months LK re-eval abd pain.    SUBJECTIVE/OBJECTIVE:  HPI From last visit: She will need GI for colonoscopy.  Incidental findings were fecal stasis, uterine fibroids, and hepatic steatosis.  CT showed COLON AND SMALL BOWEL: Fecal stasis. No evidence of incarceration or obstruction. No mesenteric adenopathy.  PERITONEUM: No ascites or lymphadenopathy.  APPENDIX: Unremarkable.  BLADDER/REPRODUCTIVE ORGANS: Uterine fibroids. Bladder is unremarkable.  Hemoglobin A1C   Date Value Ref Range Status   2025 6.1 (H) 4.0 - 5.6 % Final     Component      Latest Ref Rng 2/10/2025   WBC      3.6 - 11.0 K/uL 5.7    RBC      3.80 - 5.20 M/uL 4.62    Hemoglobin Quant      11.5 - 16.0 g/dL 13.8    Hematocrit      35.0 - 47.0 % 42.3    MCV      80.0 - 99.0 FL 91.6    MCH      26.0 - 34.0 PG 29.9    MCHC      30.0 - 36.5 g/dL 32.6    RDW      11.5 - 14.5 % 12.2    Platelet Count      150 - 400 K/uL 275    MPV      8.9 - 12.9 FL 11.4    Nucleated Red Blood Cells      0.00 - 0.01 K/uL 0.00    Nucleated Red Blood Cells      0  WBC 0.0    Neutrophils %      32.0 - 75.0 % 45.8    Lymphocyte %      12.0 - 49.0 % 42.4    Monocytes %      5.0 - 13.0 % 7.6    Eosinophils %      0.0 - 7.0 % 2.8    Basophils %      0.0 - 1.0 % 0.9    Immature Granulocytes %      0.0 - 0.5 % 0.5    Neutrophils Absolute      1.80 - 8.00 K/UL 2.61

## 2025-03-03 ASSESSMENT — ENCOUNTER SYMPTOMS
DIARRHEA: 0
ABDOMINAL PAIN: 1
VOMITING: 0
CONSTIPATION: 0
NAUSEA: 0

## 2025-03-05 ENCOUNTER — OFFICE VISIT (OUTPATIENT)
Age: 50
End: 2025-03-05

## 2025-03-05 DIAGNOSIS — Z71.89 COUNSELING AND COORDINATION OF CARE: Primary | ICD-10-CM

## 2025-03-05 NOTE — PROGRESS NOTES
Patient was a walk in for assistance with medical bills. Patient has been screened and instructed to go to any of the sites to have [facility] assist with completing a BS FA application. Patient has verbalized understanding.

## 2025-03-11 ENCOUNTER — OFFICE VISIT (OUTPATIENT)
Age: 50
End: 2025-03-11

## 2025-03-11 DIAGNOSIS — Z71.89 COUNSELING AND COORDINATION OF CARE: Primary | ICD-10-CM

## 2025-03-11 NOTE — PROGRESS NOTES
Patient came to [facility] as a walk in for medical bills. [facility] FA application has been completed. Patient will bring it to a Financial Counselor at the hospital. Patient verbalized understanding the process.

## 2025-03-11 NOTE — PROGRESS NOTES
AN financial screening is complete. Patient has been instructed to call AN appointment line on or after 3/25/25.

## 2025-06-25 ENCOUNTER — HOSPITAL ENCOUNTER (OUTPATIENT)
Facility: HOSPITAL | Age: 50
Setting detail: SPECIMEN
Discharge: HOME | End: 2025-06-28

## 2025-06-25 ENCOUNTER — OFFICE VISIT (OUTPATIENT)
Age: 50
End: 2025-06-25

## 2025-06-25 VITALS
WEIGHT: 123 LBS | SYSTOLIC BLOOD PRESSURE: 132 MMHG | OXYGEN SATURATION: 95 % | DIASTOLIC BLOOD PRESSURE: 82 MMHG | HEART RATE: 74 BPM | BODY MASS INDEX: 26.54 KG/M2 | TEMPERATURE: 98.1 F

## 2025-06-25 DIAGNOSIS — E78.2 MIXED HYPERLIPIDEMIA: ICD-10-CM

## 2025-06-25 DIAGNOSIS — R10.32 LLQ PAIN: ICD-10-CM

## 2025-06-25 DIAGNOSIS — M79.672 BILATERAL FOOT PAIN: ICD-10-CM

## 2025-06-25 DIAGNOSIS — M79.671 BILATERAL FOOT PAIN: ICD-10-CM

## 2025-06-25 DIAGNOSIS — L81.9 HYPERPIGMENTATION: ICD-10-CM

## 2025-06-25 DIAGNOSIS — Z86.19 HISTORY OF HELICOBACTER PYLORI INFECTION: ICD-10-CM

## 2025-06-25 DIAGNOSIS — R73.03 PREDIABETES: ICD-10-CM

## 2025-06-25 DIAGNOSIS — R73.03 PREDIABETES: Primary | ICD-10-CM

## 2025-06-25 LAB
ALBUMIN SERPL-MCNC: 3.5 G/DL (ref 3.5–5)
ALBUMIN/GLOB SERPL: 0.9 (ref 1.1–2.2)
ALP SERPL-CCNC: 165 U/L (ref 45–117)
ALT SERPL-CCNC: 61 U/L (ref 12–78)
ANION GAP SERPL CALC-SCNC: 6 MMOL/L (ref 2–12)
AST SERPL-CCNC: 36 U/L (ref 15–37)
BILIRUB SERPL-MCNC: 0.5 MG/DL (ref 0.2–1)
BUN SERPL-MCNC: 13 MG/DL (ref 6–20)
BUN/CREAT SERPL: 22 (ref 12–20)
CALCIUM SERPL-MCNC: 9.2 MG/DL (ref 8.5–10.1)
CHLORIDE SERPL-SCNC: 112 MMOL/L (ref 97–108)
CHOLEST SERPL-MCNC: 225 MG/DL
CO2 SERPL-SCNC: 22 MMOL/L (ref 21–32)
CREAT SERPL-MCNC: 0.58 MG/DL (ref 0.55–1.02)
GLOBULIN SER CALC-MCNC: 3.9 G/DL (ref 2–4)
GLUCOSE SERPL-MCNC: 132 MG/DL (ref 65–100)
HDLC SERPL-MCNC: 42 MG/DL
HDLC SERPL: 5.4 (ref 0–5)
LDLC SERPL CALC-MCNC: 123.4 MG/DL (ref 0–100)
POTASSIUM SERPL-SCNC: 4.2 MMOL/L (ref 3.5–5.1)
PROT SERPL-MCNC: 7.4 G/DL (ref 6.4–8.2)
SODIUM SERPL-SCNC: 140 MMOL/L (ref 136–145)
TRIGL SERPL-MCNC: 298 MG/DL
VLDLC SERPL CALC-MCNC: 59.6 MG/DL

## 2025-06-25 PROCEDURE — 80061 LIPID PANEL: CPT

## 2025-06-25 PROCEDURE — 99214 OFFICE O/P EST MOD 30 MIN: CPT | Performed by: FAMILY MEDICINE

## 2025-06-25 PROCEDURE — 80053 COMPREHEN METABOLIC PANEL: CPT

## 2025-06-25 PROCEDURE — 83036 HEMOGLOBIN GLYCOSYLATED A1C: CPT

## 2025-06-25 RX ORDER — HYDROQUINONE 40 MG/G
CREAM TOPICAL
Qty: 1 EACH | Refills: 0 | Status: SHIPPED | OUTPATIENT
Start: 2025-06-25

## 2025-06-25 SDOH — HEALTH STABILITY: PHYSICAL HEALTH: ON AVERAGE, HOW MANY DAYS PER WEEK DO YOU ENGAGE IN MODERATE TO STRENUOUS EXERCISE (LIKE A BRISK WALK)?: 1 DAY

## 2025-06-25 SDOH — HEALTH STABILITY: PHYSICAL HEALTH: ON AVERAGE, HOW MANY MINUTES DO YOU ENGAGE IN EXERCISE AT THIS LEVEL?: 150+ MIN

## 2025-06-25 ASSESSMENT — ENCOUNTER SYMPTOMS
ABDOMINAL PAIN: 1
DIARRHEA: 0
COUGH: 0
COLOR CHANGE: 1
NAUSEA: 1
SHORTNESS OF BREATH: 0
CONSTIPATION: 0

## 2025-06-25 ASSESSMENT — PATIENT HEALTH QUESTIONNAIRE - PHQ9
SUM OF ALL RESPONSES TO PHQ QUESTIONS 1-9: 0
1. LITTLE INTEREST OR PLEASURE IN DOING THINGS: NOT AT ALL
2. FEELING DOWN, DEPRESSED OR HOPELESS: NOT AT ALL
SUM OF ALL RESPONSES TO PHQ QUESTIONS 1-9: 0

## 2025-06-25 NOTE — PROGRESS NOTES
Thelma Britton is a 49 y.o. female   Chief Complaint   Patient presents with    Abdominal Pain     Follow up     ASSESSMENT AND PLAN:    1. Prediabetes  Repeat A1C today.  Pt had initially made some changes, but has loosened them sometimes.  - Comprehensive Metabolic Panel; Future  - Hemoglobin A1C; Future    2. LLQ pain  Improving. GI appt scheduled in August    3. Mixed hyperlipidemia  Repeat labs today.  - Lipid Panel; Future    4. History of Helicobacter pylori infection  Repeat/  [facility]  - H. Pylori Antigen, Stool; Future    5. Hyperpigmentation  - hydroquinone 4 % cream; Apply topically 2 times daily.  Dispense: 1 each; Refill: 0    6. Bilateral foot pain  Recommend supportive shoes. Foot soaks. NSAIDs PRN.    SUBJECTIVE:    HPI:  Thelma Britton is a 49 y.o. female who presents for abdominal pain followup  Last seen 2/28 with LK with colitis.  Had CT showing fecal stasis and uterine fibroids. Referred to GI.  Appt was scheduled for 6/16 with Dr Nabila FORD    GI Appointment was cancelled by LOGAN and rescheduled for 8/23.    Her abdominal pain is improving.   She has started drinking natural juices - Celery apple, pear, beets, pineapple. This has helped calmed the pain.  Sometimes has nausea.  No diarrhea or constipation.  Every time she eats, it doesn't matter the food, she has to go to the bathroom immediately afterwards.  Sometimes the stools are a little looser.  No blood in stools.  She feels a pulsating pain in her LLQ. She had this constantly when she had H Pylori. After treatment it resolved, but has returned intermittently.    On the lateral side of her foot she occasionally gets a mild burning pain for about 5 minutes.  Could be with or without shoes. In the house, she typically goes barefoot.  Her  believes it may be because she gained weight.    Pt has developed some dark spots on her face and arms and is requesting a cream to lighten them.    Review of Systems

## 2025-06-26 ENCOUNTER — RESULTS FOLLOW-UP (OUTPATIENT)
Age: 50
End: 2025-06-26

## 2025-06-26 LAB
EST. AVERAGE GLUCOSE BLD GHB EST-MCNC: 126 MG/DL
HBA1C MFR BLD: 6 % (ref 4–5.6)

## 2025-06-26 NOTE — RESULT ENCOUNTER NOTE
Please let the pt know that her A1C (average blood sugar over 3 months) is about the same as last time - still in the range of prediabetes.  Her cholesterol is a little better, but still high.  Normal kidney   Normal liver function

## 2025-07-03 ENCOUNTER — HOSPITAL ENCOUNTER (OUTPATIENT)
Facility: HOSPITAL | Age: 50
Setting detail: SPECIMEN
Discharge: HOME | End: 2025-07-06

## 2025-07-03 ENCOUNTER — LAB (OUTPATIENT)
Age: 50
End: 2025-07-03

## 2025-07-03 DIAGNOSIS — Z86.19 HISTORY OF HELICOBACTER PYLORI INFECTION: ICD-10-CM

## 2025-07-03 PROCEDURE — 87338 HPYLORI STOOL AG IA: CPT

## 2025-07-08 ENCOUNTER — RESULTS FOLLOW-UP (OUTPATIENT)
Age: 50
End: 2025-07-08

## 2025-07-08 LAB
H PYLORI AG STL QL IA: NEGATIVE
SPECIMEN SOURCE: NORMAL

## 2025-07-22 NOTE — TELEPHONE ENCOUNTER
Telephone call to patient regarding lab results. Name and  verified.  Patient was informed of recent lab results dated 2025. Provider's recommendations were reviewed in detail. Patient was also informed the H-Pylori test is negative. Patient verbalized understanding of treatment plan and had no further questions. Baltazar Acosta RN

## 2025-09-05 ENCOUNTER — TELEPHONE (OUTPATIENT)
Age: 50
End: 2025-09-05

## 2025-09-05 DIAGNOSIS — L81.9 HYPERPIGMENTATION: ICD-10-CM

## 2025-09-05 RX ORDER — HYDROQUINONE 40 MG/G
CREAM TOPICAL
Qty: 1 EACH | Refills: 0 | Status: SHIPPED | OUTPATIENT
Start: 2025-09-05